# Patient Record
Sex: FEMALE | Race: WHITE | NOT HISPANIC OR LATINO | Employment: FULL TIME | URBAN - METROPOLITAN AREA
[De-identification: names, ages, dates, MRNs, and addresses within clinical notes are randomized per-mention and may not be internally consistent; named-entity substitution may affect disease eponyms.]

---

## 2023-03-07 DIAGNOSIS — Z00.00 ENCOUNTER FOR GENERAL ADULT MEDICAL EXAMINATION WITHOUT ABNORMAL FINDINGS: ICD-10-CM

## 2023-03-08 PROBLEM — I10 HYPERTENSION: Status: ACTIVE | Noted: 2023-03-08

## 2023-03-08 PROBLEM — E03.9 HYPOTHYROIDISM: Status: ACTIVE | Noted: 2023-03-08

## 2023-03-08 PROBLEM — G47.00 INSOMNIA: Status: ACTIVE | Noted: 2023-03-08

## 2023-03-08 PROBLEM — F32.A DEPRESSION: Status: ACTIVE | Noted: 2023-03-08

## 2023-03-08 RX ORDER — VALACYCLOVIR HYDROCHLORIDE 500 MG/1
1 TABLET, FILM COATED ORAL DAILY
COMMUNITY
Start: 2017-06-14 | End: 2023-03-09 | Stop reason: SDUPTHER

## 2023-03-08 RX ORDER — ROSUVASTATIN CALCIUM 10 MG/1
10 TABLET, COATED ORAL NIGHTLY
COMMUNITY
Start: 2022-03-27

## 2023-03-08 RX ORDER — HYDROCHLOROTHIAZIDE 25 MG/1
1 TABLET ORAL DAILY
COMMUNITY
Start: 2022-04-04 | End: 2023-04-24 | Stop reason: ALTCHOICE

## 2023-03-09 RX ORDER — VALACYCLOVIR HYDROCHLORIDE 500 MG/1
TABLET, FILM COATED ORAL
Qty: 90 TABLET | Refills: 1 | Status: SHIPPED | OUTPATIENT
Start: 2023-03-09 | End: 2024-04-24

## 2023-03-22 PROBLEM — S69.90XA HAND INJURY: Status: ACTIVE | Noted: 2023-03-22

## 2023-03-22 PROBLEM — M20.011 MALLET FINGER OF RIGHT HAND: Status: ACTIVE | Noted: 2023-03-22

## 2023-03-22 PROBLEM — N95.1 MENOPAUSE SYNDROME: Status: ACTIVE | Noted: 2023-03-22

## 2023-03-22 PROBLEM — Z86.010 PERSONAL HISTORY OF COLONIC POLYPS: Status: ACTIVE | Noted: 2023-03-22

## 2023-03-22 PROBLEM — S59.901A ELBOW INJURY, RIGHT, INITIAL ENCOUNTER: Status: ACTIVE | Noted: 2023-03-22

## 2023-03-22 PROBLEM — R92.8 ABNORMAL MAMMOGRAM: Status: ACTIVE | Noted: 2023-03-22

## 2023-03-22 PROBLEM — E66.3 OVERWEIGHT WITH BODY MASS INDEX (BMI) OF 25 TO 25.9 IN ADULT: Status: ACTIVE | Noted: 2023-03-22

## 2023-03-22 PROBLEM — R43.0 ANOSMIA: Status: ACTIVE | Noted: 2023-03-22

## 2023-03-22 PROBLEM — R58 ECCHYMOSIS: Status: ACTIVE | Noted: 2023-03-22

## 2023-03-22 PROBLEM — M79.641 PAIN IN BOTH HANDS: Status: ACTIVE | Noted: 2023-03-22

## 2023-03-22 PROBLEM — I77.4 CELIAC ARTERY STENOSIS: Status: ACTIVE | Noted: 2023-03-22

## 2023-03-22 PROBLEM — K63.5 COLON POLYP: Status: ACTIVE | Noted: 2023-03-22

## 2023-03-22 PROBLEM — M79.642 PAIN IN BOTH HANDS: Status: ACTIVE | Noted: 2023-03-22

## 2023-03-22 PROBLEM — I77.1 CELIAC ARTERY STENOSIS (CMS-HCC): Status: ACTIVE | Noted: 2023-03-22

## 2023-03-22 PROBLEM — R21 SKIN RASH: Status: ACTIVE | Noted: 2023-03-22

## 2023-03-22 PROBLEM — M79.631 PAIN IN RIGHT FOREARM: Status: ACTIVE | Noted: 2023-03-22

## 2023-03-22 PROBLEM — F07.81 POSTCONCUSSION SYNDROME: Status: ACTIVE | Noted: 2023-03-22

## 2023-03-22 PROBLEM — I73.00 RAYNAUD'S PHENOMENON: Status: ACTIVE | Noted: 2023-03-22

## 2023-03-22 PROBLEM — R43.9 UNSPECIFIED DISTURBANCES OF SMELL AND TASTE: Status: ACTIVE | Noted: 2023-03-22

## 2023-03-22 PROBLEM — Z86.0100 PERSONAL HISTORY OF COLONIC POLYPS: Status: ACTIVE | Noted: 2023-03-22

## 2023-03-22 PROBLEM — S50.00XA ELBOW CONTUSION: Status: ACTIVE | Noted: 2023-03-22

## 2023-03-22 PROBLEM — E55.9 VITAMIN D DEFICIENCY: Status: ACTIVE | Noted: 2023-03-22

## 2023-03-22 PROBLEM — S54.22XA: Status: ACTIVE | Noted: 2023-03-22

## 2023-03-22 PROBLEM — E04.2 NONTOXIC MULTINODULAR GOITER: Status: ACTIVE | Noted: 2023-03-22

## 2023-03-22 PROBLEM — R43.1 PAROSMIA: Status: ACTIVE | Noted: 2023-03-22

## 2023-03-22 PROBLEM — G44.84 EXERTIONAL HEADACHE: Status: ACTIVE | Noted: 2023-03-22

## 2023-03-22 RX ORDER — ERGOCALCIFEROL 1.25 MG/1
1.25 CAPSULE ORAL
COMMUNITY

## 2023-03-24 ENCOUNTER — OFFICE VISIT (OUTPATIENT)
Dept: PRIMARY CARE | Facility: CLINIC | Age: 53
End: 2023-03-24
Payer: COMMERCIAL

## 2023-03-24 VITALS
TEMPERATURE: 96.8 F | DIASTOLIC BLOOD PRESSURE: 76 MMHG | HEIGHT: 65 IN | HEART RATE: 98 BPM | SYSTOLIC BLOOD PRESSURE: 120 MMHG | OXYGEN SATURATION: 98 % | WEIGHT: 152 LBS | BODY MASS INDEX: 25.33 KG/M2

## 2023-03-24 DIAGNOSIS — Z12.31 BREAST CANCER SCREENING BY MAMMOGRAM: ICD-10-CM

## 2023-03-24 DIAGNOSIS — E66.3 OVERWEIGHT WITH BODY MASS INDEX (BMI) OF 25 TO 25.9 IN ADULT: ICD-10-CM

## 2023-03-24 DIAGNOSIS — E03.9 HYPOTHYROIDISM, UNSPECIFIED TYPE: ICD-10-CM

## 2023-03-24 DIAGNOSIS — Z11.4 ENCOUNTER FOR SCREENING FOR HIV: ICD-10-CM

## 2023-03-24 DIAGNOSIS — Z00.00 ENCOUNTER FOR ANNUAL PHYSICAL EXAM: Primary | ICD-10-CM

## 2023-03-24 DIAGNOSIS — Z11.59 NEED FOR HEPATITIS C SCREENING TEST: ICD-10-CM

## 2023-03-24 DIAGNOSIS — E78.2 MIXED HYPERLIPIDEMIA: ICD-10-CM

## 2023-03-24 DIAGNOSIS — E55.9 VITAMIN D DEFICIENCY: ICD-10-CM

## 2023-03-24 DIAGNOSIS — R73.9 HYPERGLYCEMIA: ICD-10-CM

## 2023-03-24 PROBLEM — D22.9 MULTIPLE BENIGN MELANOCYTIC NEVI: Status: ACTIVE | Noted: 2021-11-16

## 2023-03-24 PROCEDURE — 3008F BODY MASS INDEX DOCD: CPT | Performed by: INTERNAL MEDICINE

## 2023-03-24 PROCEDURE — 99396 PREV VISIT EST AGE 40-64: CPT | Performed by: INTERNAL MEDICINE

## 2023-03-24 PROCEDURE — 3074F SYST BP LT 130 MM HG: CPT | Performed by: INTERNAL MEDICINE

## 2023-03-24 PROCEDURE — 3078F DIAST BP <80 MM HG: CPT | Performed by: INTERNAL MEDICINE

## 2023-03-24 RX ORDER — ATORVASTATIN CALCIUM 10 MG/1
10 TABLET, FILM COATED ORAL DAILY
Qty: 30 TABLET | Refills: 5 | Status: SHIPPED | OUTPATIENT
Start: 2023-03-24 | End: 2023-04-18

## 2023-03-24 RX ORDER — FENOFIBRATE 48 MG/1
48 TABLET, FILM COATED ORAL DAILY
Qty: 30 TABLET | Refills: 5 | Status: SHIPPED | OUTPATIENT
Start: 2023-03-24 | End: 2023-04-18

## 2023-03-24 ASSESSMENT — ENCOUNTER SYMPTOMS
CONSTIPATION: 0
POLYDIPSIA: 0
MYALGIAS: 0
APPETITE CHANGE: 0
CONFUSION: 0
SINUS PAIN: 0
WOUND: 0
DIZZINESS: 0
STRIDOR: 0
SLEEP DISTURBANCE: 0
DYSURIA: 0
PHOTOPHOBIA: 0
NUMBNESS: 0
FLANK PAIN: 0
CHEST TIGHTNESS: 0
BACK PAIN: 0
FATIGUE: 0
NERVOUS/ANXIOUS: 0
PALPITATIONS: 0
SEIZURES: 0
FEVER: 0
SHORTNESS OF BREATH: 0
ACTIVITY CHANGE: 0
HALLUCINATIONS: 0
WHEEZING: 0
VOICE CHANGE: 0
VOMITING: 0
HEADACHES: 0
DIARRHEA: 0
SORE THROAT: 0
SPEECH DIFFICULTY: 0
FACIAL ASYMMETRY: 0
UNEXPECTED WEIGHT CHANGE: 0
NAUSEA: 0
TROUBLE SWALLOWING: 0
POLYPHAGIA: 0
NECK PAIN: 0
EYE PAIN: 0
ABDOMINAL PAIN: 0
ADENOPATHY: 0
BLOOD IN STOOL: 0
WEAKNESS: 0

## 2023-03-24 ASSESSMENT — PAIN SCALES - GENERAL: PAINLEVEL: 0-NO PAIN

## 2023-03-24 NOTE — PROGRESS NOTES
"Subjective   Patient ID: Mini Johnson is a 52 y.o. female who presents for Annual Exam (Review labs).    HPI   She denied for any symptoms and want refill on medications.    Review of Systems   Constitutional:  Negative for activity change, appetite change, fatigue, fever and unexpected weight change.   HENT:  Negative for dental problem, ear discharge, hearing loss, nosebleeds, postnasal drip, sinus pain, sore throat, trouble swallowing and voice change.    Eyes:  Negative for photophobia, pain and visual disturbance.   Respiratory:  Negative for chest tightness, shortness of breath, wheezing and stridor.    Cardiovascular:  Negative for chest pain, palpitations and leg swelling.   Gastrointestinal:  Negative for abdominal pain, blood in stool, constipation, diarrhea, nausea and vomiting.   Endocrine: Negative for polydipsia, polyphagia and polyuria.   Genitourinary:  Negative for decreased urine volume, dyspareunia, dysuria, flank pain and urgency.   Musculoskeletal:  Negative for back pain, gait problem, myalgias and neck pain.   Skin:  Negative for rash and wound.   Allergic/Immunologic: Negative for environmental allergies and food allergies.   Neurological:  Negative for dizziness, seizures, syncope, facial asymmetry, speech difficulty, weakness, numbness and headaches.   Hematological:  Negative for adenopathy.   Psychiatric/Behavioral:  Negative for behavioral problems, confusion, hallucinations, sleep disturbance and suicidal ideas. The patient is not nervous/anxious.        Objective   /76   Pulse 98   Temp 36 °C (96.8 °F)   Ht 1.651 m (5' 5\")   Wt 68.9 kg (152 lb)   SpO2 98%   BMI 25.29 kg/m²     Physical Exam  Constitutional:       General: She is not in acute distress.     Appearance: Normal appearance. She is not ill-appearing or toxic-appearing.   HENT:      Head: Normocephalic and atraumatic.      Nose: Nose normal.   Eyes:      Extraocular Movements: Extraocular movements intact.     "  Conjunctiva/sclera: Conjunctivae normal.      Pupils: Pupils are equal, round, and reactive to light.   Cardiovascular:      Rate and Rhythm: Normal rate and regular rhythm.      Pulses: Normal pulses.      Heart sounds: Normal heart sounds. No murmur heard.     No gallop.   Pulmonary:      Effort: No respiratory distress.      Breath sounds: No stridor. No wheezing or rales.   Abdominal:      General: There is no distension.      Tenderness: There is no abdominal tenderness. There is no right CVA tenderness, left CVA tenderness, guarding or rebound.   Musculoskeletal:         General: No swelling or deformity. Normal range of motion.      Cervical back: Normal range of motion and neck supple. No rigidity or tenderness.      Right lower leg: No edema.      Left lower leg: No edema.   Skin:     General: Skin is warm.      Coloration: Skin is not jaundiced.      Findings: No bruising, erythema or rash.   Neurological:      General: No focal deficit present.      Mental Status: She is alert and oriented to person, place, and time.      Cranial Nerves: No cranial nerve deficit.      Gait: Gait normal.   Psychiatric:         Mood and Affect: Mood normal.         Behavior: Behavior normal.         Thought Content: Thought content normal.         Judgment: Judgment normal.       Assessment/Plan   Problem List Items Addressed This Visit       Endocrine/Metabolic    Hypothyroidism    Relevant Orders    T4, free    T3, free    TSH    Vitamin D deficiency    Relevant Orders    Vitamin D, Total     Other Visit Diagnoses       Mixed hyperlipidemia    -      Relevant Medications    atorvastatin (Lipitor) 10 mg tablet    fenofibrate (Tricor) 48 mg tablet    Other Relevant Orders    Lipid Panel    Encounter for screening for HIV        Relevant Orders    HIV-1 and HIV-2 antibodies    Need for hepatitis C screening test        Relevant Orders    Hepatitis C Antibody    Encounter for Annual Physical exam- Primary    Relevant Orders     Hemoglobin A1C    Breast cancer screening by mammogram        Relevant Orders    BI mammo bilateral screening tomosynthesis     RTC at your next office visit.

## 2023-04-06 ENCOUNTER — APPOINTMENT (OUTPATIENT)
Dept: PRIMARY CARE | Facility: CLINIC | Age: 53
End: 2023-04-06

## 2023-04-15 DIAGNOSIS — E78.2 MIXED HYPERLIPIDEMIA: ICD-10-CM

## 2023-04-17 RX ORDER — HYDROCODONE BITARTRATE AND ACETAMINOPHEN 5; 325 MG/1; MG/1
TABLET ORAL
COMMUNITY
Start: 2015-04-06 | End: 2023-04-24 | Stop reason: ALTCHOICE

## 2023-04-17 RX ORDER — CYCLOBENZAPRINE HCL 10 MG
TABLET ORAL
COMMUNITY
Start: 2015-04-17 | End: 2023-04-24 | Stop reason: ALTCHOICE

## 2023-04-17 RX ORDER — VENLAFAXINE HYDROCHLORIDE 150 MG/1
CAPSULE, EXTENDED RELEASE ORAL
COMMUNITY
Start: 2015-05-02

## 2023-04-17 RX ORDER — ZOLPIDEM TARTRATE 5 MG/1
TABLET ORAL
COMMUNITY
Start: 2015-02-11

## 2023-04-17 RX ORDER — METAXALONE 800 MG/1
TABLET ORAL
COMMUNITY
Start: 2015-04-05

## 2023-04-18 RX ORDER — ATORVASTATIN CALCIUM 10 MG/1
TABLET, FILM COATED ORAL
Qty: 30 TABLET | Refills: 5 | Status: SHIPPED | OUTPATIENT
Start: 2023-04-18

## 2023-04-18 RX ORDER — FENOFIBRATE 48 MG/1
48 TABLET, FILM COATED ORAL DAILY
Qty: 30 TABLET | Refills: 5 | Status: SHIPPED | OUTPATIENT
Start: 2023-04-18 | End: 2023-10-15

## 2023-04-24 DIAGNOSIS — E03.9 HYPOTHYROIDISM, UNSPECIFIED TYPE: ICD-10-CM

## 2023-04-24 RX ORDER — THYROID 60 MG/1
60 TABLET ORAL
Qty: 270 TABLET | Refills: 3 | Status: SHIPPED | OUTPATIENT
Start: 2023-04-24 | End: 2024-04-25

## 2023-04-24 RX ORDER — THYROID 60 MG/1
60 TABLET ORAL
COMMUNITY
End: 2023-04-24 | Stop reason: SDUPTHER

## 2023-05-08 DIAGNOSIS — N64.89 BREAST ASYMMETRY IN FEMALE: ICD-10-CM

## 2023-07-24 ENCOUNTER — APPOINTMENT (OUTPATIENT)
Dept: PRIMARY CARE | Facility: CLINIC | Age: 53
End: 2023-07-24

## 2023-11-15 ENCOUNTER — OFFICE VISIT (OUTPATIENT)
Dept: PRIMARY CARE | Facility: CLINIC | Age: 53
End: 2023-11-15
Payer: COMMERCIAL

## 2023-11-15 VITALS
WEIGHT: 147 LBS | DIASTOLIC BLOOD PRESSURE: 86 MMHG | SYSTOLIC BLOOD PRESSURE: 128 MMHG | HEART RATE: 63 BPM | BODY MASS INDEX: 24.49 KG/M2 | HEIGHT: 65 IN | OXYGEN SATURATION: 98 % | TEMPERATURE: 96.6 F

## 2023-11-15 DIAGNOSIS — R82.998 URINARY CAST, HYALINE: ICD-10-CM

## 2023-11-15 DIAGNOSIS — I10 PRIMARY HYPERTENSION: Primary | ICD-10-CM

## 2023-11-15 PROCEDURE — 3079F DIAST BP 80-89 MM HG: CPT | Performed by: FAMILY MEDICINE

## 2023-11-15 PROCEDURE — 3008F BODY MASS INDEX DOCD: CPT | Performed by: FAMILY MEDICINE

## 2023-11-15 PROCEDURE — 3074F SYST BP LT 130 MM HG: CPT | Performed by: FAMILY MEDICINE

## 2023-11-15 PROCEDURE — 4004F PT TOBACCO SCREEN RCVD TLK: CPT | Performed by: FAMILY MEDICINE

## 2023-11-15 PROCEDURE — 99213 OFFICE O/P EST LOW 20 MIN: CPT | Performed by: FAMILY MEDICINE

## 2023-11-15 ASSESSMENT — ENCOUNTER SYMPTOMS
FEVER: 0
SORE THROAT: 0
PALPITATIONS: 0
VOMITING: 0
NUMBNESS: 0
DIZZINESS: 0
ABDOMINAL PAIN: 0
HEADACHES: 0
HEMATURIA: 0
DECREASED CONCENTRATION: 0
HALLUCINATIONS: 0
FATIGUE: 0
TROUBLE SWALLOWING: 0
DYSURIA: 0
JOINT SWELLING: 0
UNEXPECTED WEIGHT CHANGE: 0
BLOOD IN STOOL: 0
FREQUENCY: 0
CONFUSION: 0
EYE PAIN: 0
WEAKNESS: 0
NAUSEA: 0
SHORTNESS OF BREATH: 0
DIARRHEA: 0
COUGH: 0

## 2023-11-15 NOTE — PROGRESS NOTES
Subjective   Patient ID: Mini Johnson is a 53 y.o. female.    Patient is being followed by urology for some sort of mass on the kidney and was noted to have hyaline casts.  She was referred back to me for work-up.  She states she is typically very hydrated, she does not do strenuous exercise.  She has no blood in the urine, she has a history of hypertension that is under good control.  She has no medicines that should be contributing to the issue.  She is due for blood work and can get it today.         Review of Systems   Constitutional:  Negative for fatigue, fever and unexpected weight change.   HENT:  Negative for congestion, ear pain, hearing loss, sore throat and trouble swallowing.    Eyes:  Negative for pain and visual disturbance.   Respiratory:  Negative for cough and shortness of breath.    Cardiovascular:  Negative for chest pain, palpitations and leg swelling.   Gastrointestinal:  Negative for abdominal pain, blood in stool, diarrhea, nausea and vomiting.   Genitourinary:  Negative for dysuria, frequency, hematuria and urgency.   Musculoskeletal:  Negative for joint swelling.   Skin:  Negative for pallor and rash.   Neurological:  Negative for dizziness, syncope, weakness, numbness and headaches.   Psychiatric/Behavioral:  Negative for confusion, decreased concentration, hallucinations and suicidal ideas.      Vitals:    11/15/23 0857   BP: 128/86   Pulse: 63   Temp: 35.9 °C (96.6 °F)   SpO2: 98%      Objective   Physical Exam  Constitutional:       Appearance: Normal appearance.   Cardiovascular:      Rate and Rhythm: Normal rate and regular rhythm.   Pulmonary:      Effort: Pulmonary effort is normal. No respiratory distress.      Breath sounds: Normal breath sounds.   Abdominal:      General: Abdomen is flat. Bowel sounds are normal.      Palpations: Abdomen is soft.      Tenderness: There is no right CVA tenderness or left CVA tenderness.   Skin:     General: Skin is warm and dry.    Neurological:      General: No focal deficit present.      Mental Status: She is alert.         Assessment/Plan   Diagnoses and all orders for this visit:  Primary hypertension  -     Urinalysis with Reflex Microscopic; Future

## 2023-11-15 NOTE — PATIENT INSTRUCTIONS
I am not overly concerned about the hyaline casts.  We will check lab work, renal function and repeat the urinalysis.

## 2023-11-16 ENCOUNTER — LAB (OUTPATIENT)
Dept: LAB | Facility: LAB | Age: 53
End: 2023-11-16
Payer: COMMERCIAL

## 2023-11-16 DIAGNOSIS — Z11.59 NEED FOR HEPATITIS C SCREENING TEST: ICD-10-CM

## 2023-11-16 DIAGNOSIS — E03.9 HYPOTHYROIDISM, UNSPECIFIED TYPE: ICD-10-CM

## 2023-11-16 DIAGNOSIS — R73.9 HYPERGLYCEMIA: ICD-10-CM

## 2023-11-16 DIAGNOSIS — N64.89 BREAST ASYMMETRY IN FEMALE: ICD-10-CM

## 2023-11-16 DIAGNOSIS — E55.9 VITAMIN D DEFICIENCY: ICD-10-CM

## 2023-11-16 DIAGNOSIS — E78.2 MIXED HYPERLIPIDEMIA: ICD-10-CM

## 2023-11-16 DIAGNOSIS — I10 PRIMARY HYPERTENSION: ICD-10-CM

## 2023-11-16 LAB
25(OH)D3 SERPL-MCNC: 17 NG/ML (ref 30–100)
APPEARANCE UR: CLEAR
BILIRUB UR STRIP.AUTO-MCNC: NEGATIVE MG/DL
CHOLEST SERPL-MCNC: 285 MG/DL (ref 0–199)
CHOLESTEROL/HDL RATIO: 5.3
COLOR UR: YELLOW
CREAT SERPL-MCNC: 1.01 MG/DL (ref 0.5–1.05)
EST. AVERAGE GLUCOSE BLD GHB EST-MCNC: 88 MG/DL
GFR SERPL CREATININE-BSD FRML MDRD: 66 ML/MIN/1.73M*2
GLUCOSE UR STRIP.AUTO-MCNC: NEGATIVE MG/DL
HBA1C MFR BLD: 4.7 %
HCV AB SER QL: NONREACTIVE
HDLC SERPL-MCNC: 53.4 MG/DL
KETONES UR STRIP.AUTO-MCNC: NEGATIVE MG/DL
LDLC SERPL CALC-MCNC: 166 MG/DL
LEUKOCYTE ESTERASE UR QL STRIP.AUTO: NEGATIVE
NITRITE UR QL STRIP.AUTO: NEGATIVE
NON HDL CHOLESTEROL: 232 MG/DL (ref 0–149)
PH UR STRIP.AUTO: 6 [PH]
PROT UR STRIP.AUTO-MCNC: NEGATIVE MG/DL
RBC # UR STRIP.AUTO: NEGATIVE /UL
SP GR UR STRIP.AUTO: 1.01
T3FREE SERPL-MCNC: 3.8 PG/ML (ref 2.3–4.2)
T4 FREE SERPL-MCNC: 0.64 NG/DL (ref 0.61–1.12)
TRIGL SERPL-MCNC: 330 MG/DL (ref 0–149)
TSH SERPL-ACNC: 2.88 MIU/L (ref 0.44–3.98)
UROBILINOGEN UR STRIP.AUTO-MCNC: <2 MG/DL
VLDL: 66 MG/DL (ref 0–40)

## 2023-11-16 PROCEDURE — 82565 ASSAY OF CREATININE: CPT

## 2023-11-16 PROCEDURE — 81003 URINALYSIS AUTO W/O SCOPE: CPT

## 2023-11-16 PROCEDURE — 84443 ASSAY THYROID STIM HORMONE: CPT

## 2023-11-16 PROCEDURE — 85025 COMPLETE CBC W/AUTO DIFF WBC: CPT

## 2023-11-16 PROCEDURE — 36415 COLL VENOUS BLD VENIPUNCTURE: CPT

## 2023-11-16 PROCEDURE — 80061 LIPID PANEL: CPT

## 2023-11-16 PROCEDURE — 82306 VITAMIN D 25 HYDROXY: CPT

## 2023-11-16 PROCEDURE — 80053 COMPREHEN METABOLIC PANEL: CPT

## 2023-11-16 PROCEDURE — 84481 FREE ASSAY (FT-3): CPT

## 2023-11-16 PROCEDURE — 86803 HEPATITIS C AB TEST: CPT

## 2023-11-16 PROCEDURE — 84439 ASSAY OF FREE THYROXINE: CPT

## 2023-11-16 PROCEDURE — 83036 HEMOGLOBIN GLYCOSYLATED A1C: CPT

## 2023-11-17 DIAGNOSIS — I10 PRIMARY HYPERTENSION: Primary | ICD-10-CM

## 2023-11-17 LAB
ALBUMIN SERPL BCP-MCNC: 4.7 G/DL (ref 3.4–5)
ALP SERPL-CCNC: 106 U/L (ref 33–110)
ALT SERPL W P-5'-P-CCNC: 11 U/L (ref 7–45)
ANION GAP SERPL CALC-SCNC: 17 MMOL/L (ref 10–20)
AST SERPL W P-5'-P-CCNC: 13 U/L (ref 9–39)
BASOPHILS # BLD AUTO: 0.03 X10*3/UL (ref 0–0.1)
BASOPHILS NFR BLD AUTO: 0.5 %
BILIRUB SERPL-MCNC: 0.6 MG/DL (ref 0–1.2)
BUN SERPL-MCNC: 18 MG/DL (ref 6–23)
CALCIUM SERPL-MCNC: 9.4 MG/DL (ref 8.6–10.6)
CHLORIDE SERPL-SCNC: 102 MMOL/L (ref 98–107)
CO2 SERPL-SCNC: 26 MMOL/L (ref 21–32)
CREAT SERPL-MCNC: 1.01 MG/DL (ref 0.5–1.05)
EOSINOPHIL # BLD AUTO: 0.1 X10*3/UL (ref 0–0.7)
EOSINOPHIL NFR BLD AUTO: 1.5 %
ERYTHROCYTE [DISTWIDTH] IN BLOOD BY AUTOMATED COUNT: 13.2 % (ref 11.5–14.5)
GFR SERPL CREATININE-BSD FRML MDRD: 66 ML/MIN/1.73M*2
GLUCOSE SERPL-MCNC: 92 MG/DL (ref 74–99)
HCT VFR BLD AUTO: 40.8 % (ref 36–46)
HGB BLD-MCNC: 12.7 G/DL (ref 12–16)
IMM GRANULOCYTES # BLD AUTO: 0.02 X10*3/UL (ref 0–0.7)
IMM GRANULOCYTES NFR BLD AUTO: 0.3 % (ref 0–0.9)
LYMPHOCYTES # BLD AUTO: 2.13 X10*3/UL (ref 1.2–4.8)
LYMPHOCYTES NFR BLD AUTO: 32.2 %
MCH RBC QN AUTO: 33.4 PG (ref 26–34)
MCHC RBC AUTO-ENTMCNC: 31.1 G/DL (ref 32–36)
MCV RBC AUTO: 107 FL (ref 80–100)
MONOCYTES # BLD AUTO: 0.61 X10*3/UL (ref 0.1–1)
MONOCYTES NFR BLD AUTO: 9.2 %
NEUTROPHILS # BLD AUTO: 3.73 X10*3/UL (ref 1.2–7.7)
NEUTROPHILS NFR BLD AUTO: 56.3 %
NRBC BLD-RTO: 0 /100 WBCS (ref 0–0)
PLATELET # BLD AUTO: 325 X10*3/UL (ref 150–450)
POTASSIUM SERPL-SCNC: 4.9 MMOL/L (ref 3.5–5.3)
PROT SERPL-MCNC: 7 G/DL (ref 6.4–8.2)
RBC # BLD AUTO: 3.8 X10*6/UL (ref 4–5.2)
SODIUM SERPL-SCNC: 140 MMOL/L (ref 136–145)
WBC # BLD AUTO: 6.6 X10*3/UL (ref 4.4–11.3)

## 2024-04-12 ENCOUNTER — LAB (OUTPATIENT)
Dept: LAB | Facility: LAB | Age: 54
End: 2024-04-12
Payer: COMMERCIAL

## 2024-04-12 DIAGNOSIS — D17.71 BENIGN LIPOMATOUS NEOPLASM OF KIDNEY: Primary | ICD-10-CM

## 2024-04-12 LAB
ANION GAP SERPL CALC-SCNC: 15 MMOL/L (ref 10–20)
BUN SERPL-MCNC: 15 MG/DL (ref 6–23)
CALCIUM SERPL-MCNC: 9.2 MG/DL (ref 8.6–10.3)
CHLORIDE SERPL-SCNC: 101 MMOL/L (ref 98–107)
CO2 SERPL-SCNC: 28 MMOL/L (ref 21–32)
CREAT SERPL-MCNC: 0.99 MG/DL (ref 0.5–1.05)
EGFRCR SERPLBLD CKD-EPI 2021: 67 ML/MIN/1.73M*2
GLUCOSE SERPL-MCNC: 100 MG/DL (ref 74–99)
POTASSIUM SERPL-SCNC: 4.3 MMOL/L (ref 3.5–5.3)
SODIUM SERPL-SCNC: 140 MMOL/L (ref 136–145)

## 2024-04-12 PROCEDURE — 36415 COLL VENOUS BLD VENIPUNCTURE: CPT

## 2024-04-12 PROCEDURE — 80048 BASIC METABOLIC PNL TOTAL CA: CPT

## 2024-04-16 ENCOUNTER — HOSPITAL ENCOUNTER (OUTPATIENT)
Dept: RADIOLOGY | Facility: HOSPITAL | Age: 54
Discharge: HOME | End: 2024-04-16
Payer: COMMERCIAL

## 2024-04-16 DIAGNOSIS — N28.89 OTHER SPECIFIED DISORDERS OF KIDNEY AND URETER: ICD-10-CM

## 2024-04-16 PROCEDURE — 74170 CT ABD WO CNTRST FLWD CNTRST: CPT

## 2024-04-16 PROCEDURE — 74170 CT ABD WO CNTRST FLWD CNTRST: CPT | Performed by: RADIOLOGY

## 2024-04-16 PROCEDURE — 2550000001 HC RX 255 CONTRASTS

## 2024-04-16 RX ADMIN — IOHEXOL 75 ML: 350 INJECTION, SOLUTION INTRAVENOUS at 08:25

## 2024-04-24 DIAGNOSIS — Z00.00 ENCOUNTER FOR GENERAL ADULT MEDICAL EXAMINATION WITHOUT ABNORMAL FINDINGS: ICD-10-CM

## 2024-04-24 RX ORDER — TOPIRAMATE 25 MG/1
TABLET ORAL EVERY 12 HOURS
COMMUNITY

## 2024-04-24 RX ORDER — VALACYCLOVIR HYDROCHLORIDE 500 MG/1
TABLET, FILM COATED ORAL
Qty: 90 TABLET | Refills: 2 | Status: SHIPPED | OUTPATIENT
Start: 2024-04-24

## 2024-04-25 DIAGNOSIS — E03.9 HYPOTHYROIDISM, UNSPECIFIED TYPE: ICD-10-CM

## 2024-04-25 RX ORDER — SULFAMETHOXAZOLE AND TRIMETHOPRIM 800; 160 MG/1; MG/1
TABLET ORAL
Qty: 270 TABLET | Refills: 3 | Status: SHIPPED | OUTPATIENT
Start: 2024-04-25

## 2024-04-25 RX ORDER — SULFAMETHOXAZOLE AND TRIMETHOPRIM 800; 160 MG/1; MG/1
TABLET ORAL
Qty: 270 TABLET | Refills: 3 | Status: SHIPPED | OUTPATIENT
Start: 2024-04-25 | End: 2024-04-25

## 2024-04-27 ENCOUNTER — HOSPITAL ENCOUNTER (OUTPATIENT)
Dept: RADIOLOGY | Facility: EXTERNAL LOCATION | Age: 54
Discharge: HOME | End: 2024-04-27
Payer: COMMERCIAL

## 2024-07-24 ENCOUNTER — APPOINTMENT (OUTPATIENT)
Dept: PODIATRY | Facility: CLINIC | Age: 54
End: 2024-07-24
Payer: COMMERCIAL

## 2024-07-24 DIAGNOSIS — M79.674 GREAT TOE PAIN, RIGHT: Primary | ICD-10-CM

## 2024-07-24 PROCEDURE — 99202 OFFICE O/P NEW SF 15 MIN: CPT | Performed by: PODIATRIST

## 2024-07-24 NOTE — PROGRESS NOTES
This is a 54 y.o. 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, initial encounter (Multi) 07/11/2016    Contusion of brain    Headache, unspecified 08/09/2017    Acute intractable headache, unspecified headache type    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    Other specified soft tissue disorders     Limb swelling    Pain in unspecified limb 10/04/2016    Limb pain    Paresthesia of skin 10/06/2016    Tingling    Personal history of nicotine dependence 07/11/2016    Former smoker    Personal history of other (healed) physical injury and trauma     History of sprain of knee    Personal history of other diseases of the digestive system 01/31/2017    History of diverticulitis of colon    Personal history of other diseases of the musculoskeletal system and connective tissue 05/27/2014    History of muscle spasm    Personal history of other diseases of the musculoskeletal system and connective tissue 08/15/2016    History of neck pain    Personal history of other diseases of the musculoskeletal system and connective tissue 01/08/2017    History of low back pain    Personal history of other infectious and parasitic diseases 12/04/2017    History of herpes labialis    Personal history of other medical treatment 06/24/2016    History of screening mammography    Personal history of other medical treatment     History of screening mammography    Personal history of  other specified conditions 01/14/2017    History of diarrhea    Personal history of other specified conditions 10/14/2016    History of numbness    Personal history of other specified conditions 05/27/2014    History of dysuria    Personal history of other specified conditions 06/25/2018    History of bruising easily    Personal history of other specified conditions 06/25/2018    History of wheezing    Personal history of other specified conditions 01/24/2015    History of fatigue    Personal history of other specified conditions 08/08/2018    History of headache    Personal history of other specified conditions     History of fatigue    Personal history of pneumonia (recurrent) 08/08/2018    History of pneumonia    Personal history of traumatic brain injury 10/01/2017    History of concussion    Personal history of traumatic brain injury 12/18/2018    History of closed head injury    Post-surgical hypothyroidism 10/30/2013    S/P hysterectomy 10/02/2014    Vaginal lesion 11/28/2011       Medications and Allergies have been reviewed.    Review Of Systems:  GENERAL: No weight loss, malaise or fevers.  HEENT: Negative for frequent or significant headaches,   RESPIRATORY: Negative for cough, wheezing or shortness of breath.  CARDIOVASCULAR: Negative for chest pain, leg swelling or palpitations.    Physical Exam:  Patient is a pleasant, cooperative, well developed 54 y.o.  adult female. The patient is alert and oriented to time, place and person.   Patient has normal affect and mood.    Examination of Both Lower Extremities:   Objective:   Vasc: DP and PT pulses are palpable bilateral.  CFT is less than 3 seconds bilateral.  Skin temperature is warm to cool proximal to distal bilateral.      Neuro: Vibratory, light touch and proprioception are intact bilateral.  Protective sensation is intact to the foot when tested with the 5.07 SWM bilateral.  Achilles reflex is 2/4 bilateral.  There is no clonus noted.  The hallux is  downgoing bilateral.      Derm: Nails 1-5 bilateral are intact.  Right hallux notes mild thickness and discolor.     Ortho: Muscle strength is 5/5 for all pedal groups tested.       1. Great toe pain, right          Patient exam and eval  Discussed thick nails  No SOI noted  Recommend keeping filed down  Ok to paint if needed  No otherp edal complaints  Fu prn    Dolly Rosenthal DPM  [phone]  [facility]  Fax: [phone]

## 2024-07-31 ENCOUNTER — TELEPHONE (OUTPATIENT)
Dept: PRIMARY CARE | Facility: CLINIC | Age: 54
End: 2024-07-31
Payer: COMMERCIAL

## 2024-07-31 NOTE — TELEPHONE ENCOUNTER
Left leg numbness since yesterday. No pain, swelling or redness. Wanting to be seen. Would you like to squeeze her in or is she okay to wait until Monday? Offered Emmanuel but denied seeing him.

## 2024-08-05 ENCOUNTER — APPOINTMENT (OUTPATIENT)
Dept: PRIMARY CARE | Facility: CLINIC | Age: 54
End: 2024-08-05
Payer: COMMERCIAL

## 2024-08-16 ENCOUNTER — APPOINTMENT (OUTPATIENT)
Dept: PRIMARY CARE | Facility: CLINIC | Age: 54
End: 2024-08-16
Payer: COMMERCIAL

## 2024-08-16 VITALS
TEMPERATURE: 98.2 F | BODY MASS INDEX: 22.96 KG/M2 | OXYGEN SATURATION: 98 % | WEIGHT: 137.8 LBS | SYSTOLIC BLOOD PRESSURE: 138 MMHG | HEART RATE: 86 BPM | HEIGHT: 65 IN | DIASTOLIC BLOOD PRESSURE: 86 MMHG

## 2024-08-16 DIAGNOSIS — M79.2 LEG NEURALGIA, LEFT: ICD-10-CM

## 2024-08-16 DIAGNOSIS — D17.71 RENAL ANGIOLIPOMA: Primary | ICD-10-CM

## 2024-08-16 DIAGNOSIS — E03.9 ACQUIRED HYPOTHYROIDISM: ICD-10-CM

## 2024-08-16 PROBLEM — F32.A DEPRESSIVE DISORDER: Status: ACTIVE | Noted: 2023-03-08

## 2024-08-16 PROBLEM — L82.1 OTHER SEBORRHEIC KERATOSIS: Status: ACTIVE | Noted: 2017-10-25

## 2024-08-16 PROBLEM — N95.1 MENOPAUSAL SYNDROME: Status: ACTIVE | Noted: 2023-03-22

## 2024-08-16 PROBLEM — S50.00XA CONTUSION OF ELBOW: Status: ACTIVE | Noted: 2024-08-16

## 2024-08-16 PROBLEM — E66.3 OVERWEIGHT WITH BODY MASS INDEX (BMI) 25.0-29.9: Status: ACTIVE | Noted: 2024-08-16

## 2024-08-16 PROBLEM — D22.39 MELANOCYTIC NEVI OF OTHER PARTS OF FACE: Status: ACTIVE | Noted: 2021-11-16

## 2024-08-16 PROBLEM — D18.01 HEMANGIOMA OF SKIN AND SUBCUTANEOUS TISSUE: Status: ACTIVE | Noted: 2021-11-16

## 2024-08-16 PROBLEM — S54.20XA: Status: ACTIVE | Noted: 2023-03-22

## 2024-08-16 PROBLEM — L90.5 SCAR CONDITIONS AND FIBROSIS OF SKIN: Status: ACTIVE | Noted: 2017-10-25

## 2024-08-16 PROBLEM — L90.5 SCAR CONDITION AND FIBROSIS OF SKIN: Status: ACTIVE | Noted: 2021-11-16

## 2024-08-16 PROBLEM — M79.643 PAIN OF HAND: Status: ACTIVE | Noted: 2024-08-16

## 2024-08-16 PROBLEM — S59.909A ELBOW INJURY: Status: ACTIVE | Noted: 2023-03-22

## 2024-08-16 PROBLEM — M79.89 SWELLING OF EXTREMITY: Status: ACTIVE | Noted: 2024-08-16

## 2024-08-16 PROBLEM — D22.70 MELANOCYTIC NEVI OF UNSPECIFIED LOWER LIMB, INCLUDING HIP: Status: ACTIVE | Noted: 2021-11-16

## 2024-08-16 PROBLEM — M20.009 ACQUIRED DEFORMITY OF DISTAL INTERPHALANGEAL (DIP) JOINT OF FINGER DUE TO TRAUMA: Status: ACTIVE | Noted: 2024-08-16

## 2024-08-16 PROBLEM — D22.4 MELANOCYTIC NEVI OF SCALP AND NECK: Status: ACTIVE | Noted: 2017-10-25

## 2024-08-16 PROBLEM — R03.0 ELEVATED BLOOD PRESSURE READING WITHOUT DIAGNOSIS OF HYPERTENSION: Status: ACTIVE | Noted: 2024-08-16

## 2024-08-16 PROBLEM — R21 RASH: Status: ACTIVE | Noted: 2023-03-22

## 2024-08-16 PROBLEM — L91.8 OTHER HYPERTROPHIC DISORDERS OF THE SKIN: Status: ACTIVE | Noted: 2021-11-16

## 2024-08-16 PROBLEM — L81.4 OTHER MELANIN HYPERPIGMENTATION: Status: ACTIVE | Noted: 2021-11-16

## 2024-08-16 PROBLEM — D22.5 MELANOCYTIC NEVI OF TRUNK: Status: ACTIVE | Noted: 2021-11-16

## 2024-08-16 PROBLEM — Z86.010 HISTORY OF COLONIC POLYPS: Status: ACTIVE | Noted: 2023-03-22

## 2024-08-16 PROBLEM — D49.2 NEOPLASM OF UNSPECIFIED BEHAVIOR OF BONE, SOFT TISSUE, AND SKIN: Status: ACTIVE | Noted: 2021-11-16

## 2024-08-16 PROBLEM — D22.62 MELANOCYTIC NEVI OF LEFT UPPER LIMB, INCLUDING SHOULDER: Status: ACTIVE | Noted: 2021-11-16

## 2024-08-16 PROBLEM — D44.0 NEOPLASM OF UNCERTAIN BEHAVIOR OF THYROID GLAND: Status: ACTIVE | Noted: 2017-10-25

## 2024-08-16 PROBLEM — R43.0 LOSS OF SENSE OF SMELL: Status: ACTIVE | Noted: 2023-03-22

## 2024-08-16 PROBLEM — Z86.0100 HISTORY OF COLONIC POLYPS: Status: ACTIVE | Noted: 2023-03-22

## 2024-08-16 PROBLEM — L81.9 DISORDER OF PIGMENTATION, UNSPECIFIED: Status: ACTIVE | Noted: 2017-10-25

## 2024-08-16 PROCEDURE — 99214 OFFICE O/P EST MOD 30 MIN: CPT | Performed by: FAMILY MEDICINE

## 2024-08-16 PROCEDURE — 3008F BODY MASS INDEX DOCD: CPT | Performed by: FAMILY MEDICINE

## 2024-08-16 RX ORDER — METHYLPREDNISOLONE 4 MG/1
TABLET ORAL
Qty: 21 TABLET | Refills: 0 | Status: SHIPPED | OUTPATIENT
Start: 2024-08-16

## 2024-08-16 ASSESSMENT — PATIENT HEALTH QUESTIONNAIRE - PHQ9
1. LITTLE INTEREST OR PLEASURE IN DOING THINGS: NOT AT ALL
2. FEELING DOWN, DEPRESSED OR HOPELESS: NOT AT ALL
SUM OF ALL RESPONSES TO PHQ9 QUESTIONS 1 AND 2: 0

## 2024-08-16 ASSESSMENT — PAIN SCALES - GENERAL: PAINLEVEL: 0-NO PAIN

## 2024-08-17 ENCOUNTER — LAB (OUTPATIENT)
Dept: LAB | Facility: LAB | Age: 54
End: 2024-08-17
Payer: COMMERCIAL

## 2024-08-17 DIAGNOSIS — M79.2 LEG NEURALGIA, LEFT: ICD-10-CM

## 2024-08-17 DIAGNOSIS — D17.71 RENAL ANGIOLIPOMA: ICD-10-CM

## 2024-08-17 DIAGNOSIS — E03.9 ACQUIRED HYPOTHYROIDISM: ICD-10-CM

## 2024-08-17 LAB
25(OH)D3 SERPL-MCNC: 38 NG/ML (ref 30–100)
ALBUMIN SERPL BCP-MCNC: 4.8 G/DL (ref 3.4–5)
ALP SERPL-CCNC: 121 U/L (ref 33–110)
ALT SERPL W P-5'-P-CCNC: 12 U/L (ref 7–45)
ANION GAP SERPL CALC-SCNC: 14 MMOL/L (ref 10–20)
APPEARANCE UR: CLEAR
AST SERPL W P-5'-P-CCNC: 13 U/L (ref 9–39)
BASOPHILS # BLD AUTO: 0.03 X10*3/UL (ref 0–0.1)
BASOPHILS NFR BLD AUTO: 0.4 %
BILIRUB SERPL-MCNC: 0.8 MG/DL (ref 0–1.2)
BILIRUB UR STRIP.AUTO-MCNC: NEGATIVE MG/DL
BUN SERPL-MCNC: 18 MG/DL (ref 6–23)
CALCIUM SERPL-MCNC: 9.6 MG/DL (ref 8.6–10.3)
CHLORIDE SERPL-SCNC: 103 MMOL/L (ref 98–107)
CHOLEST SERPL-MCNC: 212 MG/DL (ref 0–199)
CHOLESTEROL/HDL RATIO: 3.8
CO2 SERPL-SCNC: 26 MMOL/L (ref 21–32)
COLOR UR: NORMAL
CREAT SERPL-MCNC: 0.98 MG/DL (ref 0.5–1.05)
EGFRCR SERPLBLD CKD-EPI 2021: 68 ML/MIN/1.73M*2
EOSINOPHIL # BLD AUTO: 0.09 X10*3/UL (ref 0–0.7)
EOSINOPHIL NFR BLD AUTO: 1.1 %
ERYTHROCYTE [DISTWIDTH] IN BLOOD BY AUTOMATED COUNT: 12.1 % (ref 11.5–14.5)
EST. AVERAGE GLUCOSE BLD GHB EST-MCNC: 100 MG/DL
GLUCOSE SERPL-MCNC: 101 MG/DL (ref 74–99)
GLUCOSE UR STRIP.AUTO-MCNC: NORMAL MG/DL
HBA1C MFR BLD: 5.1 %
HCT VFR BLD AUTO: 42.2 % (ref 36–46)
HDLC SERPL-MCNC: 56.5 MG/DL
HGB BLD-MCNC: 13.9 G/DL (ref 12–16)
IMM GRANULOCYTES # BLD AUTO: 0.02 X10*3/UL (ref 0–0.7)
IMM GRANULOCYTES NFR BLD AUTO: 0.2 % (ref 0–0.9)
KETONES UR STRIP.AUTO-MCNC: NEGATIVE MG/DL
LDLC SERPL CALC-MCNC: 119 MG/DL
LEUKOCYTE ESTERASE UR QL STRIP.AUTO: NEGATIVE
LYMPHOCYTES # BLD AUTO: 1.81 X10*3/UL (ref 1.2–4.8)
LYMPHOCYTES NFR BLD AUTO: 22.2 %
MCH RBC QN AUTO: 32.3 PG (ref 26–34)
MCHC RBC AUTO-ENTMCNC: 32.9 G/DL (ref 32–36)
MCV RBC AUTO: 98 FL (ref 80–100)
MONOCYTES # BLD AUTO: 0.74 X10*3/UL (ref 0.1–1)
MONOCYTES NFR BLD AUTO: 9.1 %
NEUTROPHILS # BLD AUTO: 5.46 X10*3/UL (ref 1.2–7.7)
NEUTROPHILS NFR BLD AUTO: 67 %
NITRITE UR QL STRIP.AUTO: NEGATIVE
NON HDL CHOLESTEROL: 156 MG/DL (ref 0–149)
NRBC BLD-RTO: 0 /100 WBCS (ref 0–0)
PH UR STRIP.AUTO: 6 [PH]
PLATELET # BLD AUTO: 317 X10*3/UL (ref 150–450)
POTASSIUM SERPL-SCNC: 4.6 MMOL/L (ref 3.5–5.3)
PROT SERPL-MCNC: 7.1 G/DL (ref 6.4–8.2)
PROT UR STRIP.AUTO-MCNC: NEGATIVE MG/DL
RBC # BLD AUTO: 4.31 X10*6/UL (ref 4–5.2)
RBC # UR STRIP.AUTO: NEGATIVE /UL
SODIUM SERPL-SCNC: 138 MMOL/L (ref 136–145)
SP GR UR STRIP.AUTO: 1.01
TRIGL SERPL-MCNC: 183 MG/DL (ref 0–149)
TSH SERPL-ACNC: 0.06 MIU/L (ref 0.44–3.98)
UROBILINOGEN UR STRIP.AUTO-MCNC: NORMAL MG/DL
VLDL: 37 MG/DL (ref 0–40)
WBC # BLD AUTO: 8.2 X10*3/UL (ref 4.4–11.3)

## 2024-08-17 PROCEDURE — 84443 ASSAY THYROID STIM HORMONE: CPT

## 2024-08-17 PROCEDURE — 82306 VITAMIN D 25 HYDROXY: CPT

## 2024-08-17 PROCEDURE — 83036 HEMOGLOBIN GLYCOSYLATED A1C: CPT

## 2024-08-17 PROCEDURE — 81003 URINALYSIS AUTO W/O SCOPE: CPT

## 2024-08-17 PROCEDURE — 36415 COLL VENOUS BLD VENIPUNCTURE: CPT

## 2024-08-17 PROCEDURE — 85025 COMPLETE CBC W/AUTO DIFF WBC: CPT

## 2024-08-17 PROCEDURE — 80053 COMPREHEN METABOLIC PANEL: CPT

## 2024-08-17 PROCEDURE — 86038 ANTINUCLEAR ANTIBODIES: CPT

## 2024-08-17 PROCEDURE — 80061 LIPID PANEL: CPT

## 2024-08-20 ENCOUNTER — HOSPITAL ENCOUNTER (OUTPATIENT)
Dept: NEUROLOGY | Facility: CLINIC | Age: 54
Discharge: HOME | End: 2024-08-20
Payer: COMMERCIAL

## 2024-08-20 DIAGNOSIS — M79.2 LEG NEURALGIA, LEFT: ICD-10-CM

## 2024-08-20 PROCEDURE — 95886 MUSC TEST DONE W/N TEST COMP: CPT | Performed by: PSYCHIATRY & NEUROLOGY

## 2024-08-20 PROCEDURE — 95910 NRV CNDJ TEST 7-8 STUDIES: CPT | Performed by: PSYCHIATRY & NEUROLOGY

## 2024-08-21 LAB — ANA SER QL HEP2 SUBST: NEGATIVE

## 2024-08-22 PROBLEM — S59.901A ELBOW INJURY, RIGHT, INITIAL ENCOUNTER: Status: RESOLVED | Noted: 2023-03-22 | Resolved: 2024-08-22

## 2024-08-22 PROBLEM — R43.0 LOSS OF SENSE OF SMELL: Status: RESOLVED | Noted: 2023-03-22 | Resolved: 2024-08-22

## 2024-08-22 PROBLEM — M79.2: Status: ACTIVE | Noted: 2024-08-22

## 2024-08-22 PROBLEM — M79.643 PAIN OF HAND: Status: RESOLVED | Noted: 2024-08-16 | Resolved: 2024-08-22

## 2024-08-22 PROBLEM — R21 RASH: Status: RESOLVED | Noted: 2023-03-22 | Resolved: 2024-08-22

## 2024-08-22 PROBLEM — E66.3 OVERWEIGHT WITH BODY MASS INDEX (BMI) OF 25 TO 25.9 IN ADULT: Status: RESOLVED | Noted: 2023-03-22 | Resolved: 2024-08-22

## 2024-08-22 PROBLEM — Z86.010 HISTORY OF COLONIC POLYPS: Status: RESOLVED | Noted: 2023-03-22 | Resolved: 2024-08-22

## 2024-08-22 PROBLEM — M79.89 SWELLING OF EXTREMITY: Status: RESOLVED | Noted: 2024-08-16 | Resolved: 2024-08-22

## 2024-08-22 PROBLEM — E66.3 OVERWEIGHT WITH BODY MASS INDEX (BMI) 25.0-29.9: Status: RESOLVED | Noted: 2024-08-16 | Resolved: 2024-08-22

## 2024-08-22 PROBLEM — R21 SKIN RASH: Status: RESOLVED | Noted: 2023-03-22 | Resolved: 2024-08-22

## 2024-08-22 PROBLEM — R43.1 PAROSMIA: Status: RESOLVED | Noted: 2023-03-22 | Resolved: 2024-08-22

## 2024-08-22 PROBLEM — F32.A DEPRESSIVE DISORDER: Status: RESOLVED | Noted: 2023-03-08 | Resolved: 2024-08-22

## 2024-08-22 PROBLEM — M79.641 PAIN IN BOTH HANDS: Status: RESOLVED | Noted: 2023-03-22 | Resolved: 2024-08-22

## 2024-08-22 PROBLEM — E03.9 HYPOTHYROID: Status: RESOLVED | Noted: 2024-08-16 | Resolved: 2024-08-22

## 2024-08-22 PROBLEM — Z86.010 PERSONAL HISTORY OF COLONIC POLYPS: Status: RESOLVED | Noted: 2023-03-22 | Resolved: 2024-08-22

## 2024-08-22 PROBLEM — Z86.0100 PERSONAL HISTORY OF COLONIC POLYPS: Status: RESOLVED | Noted: 2023-03-22 | Resolved: 2024-08-22

## 2024-08-22 PROBLEM — N95.1 MENOPAUSAL SYNDROME: Status: RESOLVED | Noted: 2023-03-22 | Resolved: 2024-08-22

## 2024-08-22 PROBLEM — R58 ECCHYMOSIS: Status: RESOLVED | Noted: 2023-03-22 | Resolved: 2024-08-22

## 2024-08-22 PROBLEM — R03.0 ELEVATED BLOOD PRESSURE READING WITHOUT DIAGNOSIS OF HYPERTENSION: Status: RESOLVED | Noted: 2024-08-16 | Resolved: 2024-08-22

## 2024-08-22 PROBLEM — R43.9 UNSPECIFIED DISTURBANCES OF SMELL AND TASTE: Status: RESOLVED | Noted: 2023-03-22 | Resolved: 2024-08-22

## 2024-08-22 PROBLEM — M79.631 PAIN IN RIGHT FOREARM: Status: RESOLVED | Noted: 2023-03-22 | Resolved: 2024-08-22

## 2024-08-22 PROBLEM — M20.009 ACQUIRED DEFORMITY OF DISTAL INTERPHALANGEAL (DIP) JOINT OF FINGER DUE TO TRAUMA: Status: RESOLVED | Noted: 2024-08-16 | Resolved: 2024-08-22

## 2024-08-22 PROBLEM — Z86.0100 HISTORY OF COLONIC POLYPS: Status: RESOLVED | Noted: 2023-03-22 | Resolved: 2024-08-22

## 2024-08-22 PROBLEM — S54.22XA: Status: RESOLVED | Noted: 2023-03-22 | Resolved: 2024-08-22

## 2024-08-22 PROBLEM — D17.71 RENAL ANGIOLIPOMA: Status: ACTIVE | Noted: 2024-08-22

## 2024-08-22 PROBLEM — M79.642 PAIN IN BOTH HANDS: Status: RESOLVED | Noted: 2023-03-22 | Resolved: 2024-08-22

## 2024-08-22 PROBLEM — I10 HYPERTENSION: Status: RESOLVED | Noted: 2023-03-08 | Resolved: 2024-08-22

## 2024-08-22 PROBLEM — S50.00XA CONTUSION OF ELBOW: Status: RESOLVED | Noted: 2024-08-16 | Resolved: 2024-08-22

## 2024-08-22 PROBLEM — L90.5 SCAR CONDITION AND FIBROSIS OF SKIN: Status: RESOLVED | Noted: 2021-11-16 | Resolved: 2024-08-22

## 2024-08-22 PROBLEM — R82.998 URINARY CAST, HYALINE: Status: RESOLVED | Noted: 2023-11-15 | Resolved: 2024-08-22

## 2024-08-22 ASSESSMENT — ENCOUNTER SYMPTOMS
UNEXPECTED WEIGHT CHANGE: 0
VOMITING: 0
PALPITATIONS: 0
FEVER: 0
FREQUENCY: 0
HALLUCINATIONS: 0
HEMATURIA: 0
HEADACHES: 0
DECREASED CONCENTRATION: 0
DYSURIA: 0
NUMBNESS: 1
COUGH: 0
TROUBLE SWALLOWING: 0
DIARRHEA: 0
ABDOMINAL PAIN: 0
NAUSEA: 0
BLOOD IN STOOL: 0
JOINT SWELLING: 0
CONFUSION: 0
SORE THROAT: 0
WEAKNESS: 0
EYE PAIN: 0
SHORTNESS OF BREATH: 0
FATIGUE: 0
DIZZINESS: 0

## 2024-08-22 NOTE — PROGRESS NOTES
Subjective   Patient ID: Mini Johnson is a 54 y.o. female.    Patient is here for few issues.  She has been having numbness and tingling below the left knee.  There is been no trauma.  She was not leaning her leg up against an object or crossing her legs for an inordinate amount of time.  It has been going on for weeks to months.  There is no weakness.  She has hypothyroidism and is due for labs.  She has a history of renal angiolipoma that is getting larger.  She will need a urologist.  She has no increased frequency or hematuria.  History of Raynaud's but no known autoimmune disease.  There is no back pain.  She has no swelling of the calf but may have been at the onset.  No redness.  No history of clot.        Review of Systems   Constitutional:  Negative for fatigue, fever and unexpected weight change.   HENT:  Negative for congestion, ear pain, hearing loss, sore throat and trouble swallowing.    Eyes:  Negative for pain and visual disturbance.   Respiratory:  Negative for cough and shortness of breath.    Cardiovascular:  Negative for chest pain, palpitations and leg swelling.   Gastrointestinal:  Negative for abdominal pain, blood in stool, diarrhea, nausea and vomiting.   Genitourinary:  Negative for dysuria, frequency, hematuria and urgency.   Musculoskeletal:  Negative for joint swelling.   Skin:  Negative for pallor and rash.   Neurological:  Positive for numbness. Negative for dizziness, syncope, weakness and headaches.   Psychiatric/Behavioral:  Negative for confusion, decreased concentration, hallucinations and suicidal ideas.      Vitals:    08/16/24 1459   BP: 138/86   Pulse: 86   Temp: 36.8 °C (98.2 °F)   SpO2: 98%      Objective   Physical Exam  Constitutional:       Appearance: Normal appearance.   Musculoskeletal:      Comments: There is no deformity to observation or palpation.  It actually measures a few millimeters less then the right calf.  Negative Homans' sign.  There is no weakness.   There is decreased sensation.  Below the knee.   Neurological:      Mental Status: She is alert.         Assessment/Plan   Diagnoses and all orders for this visit:  Renal angiolipoma  -     Referral to Urology; Future  -     Comprehensive Metabolic Panel; Future  -     Urinalysis with Reflex Microscopic; Future  Leg neuralgia, left  -     EMG & nerve conduction; Future  -     methylPREDNISolone (Medrol, Mehul,) 4 mg tablets; Follow schedule on package instructions  -     NANCY with Reflex to YOU; Future  Acquired hypothyroidism  -     CBC and Auto Differential; Future  -     Comprehensive Metabolic Panel; Future  -     Hemoglobin A1C; Future  -     Lipid Panel; Future  -     Thyroid Stimulating Hormone; Future  -     Vitamin D 25-Hydroxy,Total (for eval of Vitamin D levels); Future

## 2024-08-22 NOTE — PATIENT INSTRUCTIONS
It was nice to see you today!  Discussed current concerns and addressed   Reviewed recent labs and diagnostics  Reviewed medications list  Continue to eat a healthy diet, exercise at least 3 times a week or more  Plan and follow up discussed  For any further information related to your condition, copy and paste or go to [email or web address]  Very curious diminished sensation in the left lower extremity I am not clear what.  Perhaps a peripheral nerve inflammation.  We discussed watching and waiting but due to the length of symptoms we will pursue with an EMG.  We will also try a Medrol Dosepak and avoid crossing legs and applying pressure on the lower extremity.  We will recheck all of her labs including TSH.  I will get her into urology for further evaluation and treatment of the exophytic renal angiolipoma that is growing.

## 2024-08-27 ENCOUNTER — TELEPHONE (OUTPATIENT)
Dept: PRIMARY CARE | Facility: CLINIC | Age: 54
End: 2024-08-27
Payer: COMMERCIAL

## 2024-08-27 DIAGNOSIS — Z12.39 BREAST SCREENING: ICD-10-CM

## 2024-08-29 ENCOUNTER — HOSPITAL ENCOUNTER (OUTPATIENT)
Dept: RADIOLOGY | Facility: HOSPITAL | Age: 54
Discharge: HOME | End: 2024-08-29
Payer: COMMERCIAL

## 2024-08-29 VITALS — WEIGHT: 133 LBS | BODY MASS INDEX: 22.16 KG/M2 | HEIGHT: 65 IN

## 2024-08-29 DIAGNOSIS — Z12.39 BREAST SCREENING: ICD-10-CM

## 2024-08-29 PROCEDURE — 77067 SCR MAMMO BI INCL CAD: CPT

## 2024-09-03 ENCOUNTER — TELEPHONE (OUTPATIENT)
Dept: PRIMARY CARE | Facility: CLINIC | Age: 54
End: 2024-09-03
Payer: COMMERCIAL

## 2024-09-03 NOTE — TELEPHONE ENCOUNTER
Pt called stating she received abnormal mammogram results and was told to call to get order for diagnostics

## 2024-09-05 ENCOUNTER — APPOINTMENT (OUTPATIENT)
Dept: NEUROLOGY | Facility: CLINIC | Age: 54
End: 2024-09-05
Payer: COMMERCIAL

## 2024-09-10 ENCOUNTER — HOSPITAL ENCOUNTER (OUTPATIENT)
Dept: RADIOLOGY | Facility: CLINIC | Age: 54
Discharge: HOME | End: 2024-09-10
Payer: COMMERCIAL

## 2024-09-10 DIAGNOSIS — Z12.39 BREAST SCREENING: ICD-10-CM

## 2024-09-10 PROCEDURE — 77061 BREAST TOMOSYNTHESIS UNI: CPT | Mod: RIGHT SIDE | Performed by: STUDENT IN AN ORGANIZED HEALTH CARE EDUCATION/TRAINING PROGRAM

## 2024-09-10 PROCEDURE — 77065 DX MAMMO INCL CAD UNI: CPT | Mod: RIGHT SIDE | Performed by: STUDENT IN AN ORGANIZED HEALTH CARE EDUCATION/TRAINING PROGRAM

## 2024-09-10 PROCEDURE — 77061 BREAST TOMOSYNTHESIS UNI: CPT | Mod: RT

## 2024-09-12 ENCOUNTER — APPOINTMENT (OUTPATIENT)
Dept: RADIOLOGY | Facility: HOSPITAL | Age: 54
End: 2024-09-12
Payer: COMMERCIAL

## 2024-09-17 DIAGNOSIS — R92.8 ABNORMAL SCREENING MAMMOGRAM: Primary | ICD-10-CM

## 2024-09-30 ENCOUNTER — APPOINTMENT (OUTPATIENT)
Dept: UROLOGY | Facility: CLINIC | Age: 54
End: 2024-09-30
Payer: COMMERCIAL

## 2024-09-30 DIAGNOSIS — D17.71 RENAL ANGIOLIPOMA: ICD-10-CM

## 2024-09-30 PROCEDURE — 99204 OFFICE O/P NEW MOD 45 MIN: CPT | Performed by: STUDENT IN AN ORGANIZED HEALTH CARE EDUCATION/TRAINING PROGRAM

## 2024-09-30 NOTE — PROGRESS NOTES
Subjective   Patient ID: Mini Johnson is a 54 y.o. female.    HPI  Mini Johnson is a 54 y.o. female with a diagnosis of renal angiolipoma who was referred to us today by Dr. Jacome.     Patient had an increase in her left renal angiolipoma size from 2.2cm in 2022 to 3.1cm in 2023 recorded on ultrasound. We do not have an MRI. CT showed 1.65cm in 2020. CT in the past showed 1.7cm. Last CT shows a 2.3cm size.    CT of kidney 04/16/2024  IMPRESSION:  Left renal upper pole partially exophytic 2.3 cm macroscopic fat containing mass compatible with an angiomyolipoma, slightly larger compared to prior CT of 2017.    Review of Systems  A complete review of systems was performed. All systems are noted to be negative unless indicated in the history of present illness, impression, active problem list, or past histories.    Objective   Physical Exam  NAD, alert  No abdominal scars  No CVA tenderness    Assessment/Plan   Diagnoses and all orders for this visit:  Renal angiolipoma  -     Referral to Urology  Mini Johnson is a 54 y.o. female with a diagnosis of renal angiolipoma who was referred to us today by Dr. Jacome.     I personally reviewed the medical records of the patient including the note of the referring physician including the CT images as well as report. CT of kidney preformed on 04/16/2024 showed left renal upper pole partially exophytic 2.3 cm macroscopic fat containing mass compatible with an angiomyolipoma, slightly larger compared to prior CT of 2017.    We discussed continue to watch the patient and do not see a need to move forward with surgery or angioembolization considering the slow rate of growth and the size under 4cm, at which the risk of spontaneous hemorrhage exceeds 50%. Her imaging appears to be consistent. We will follow up with her in 6 months with an MRI of her kidneys ordered for April.     Plan:   MRI of kidneys ordered for April.   Follow up in May    Scribe Attestation  By signing my  name below, I, Zabrina Acosta   attest that this documentation has been prepared under the direction and in the presence of Radha Nuñez MD.

## 2024-09-30 NOTE — LETTER
September 30, 2024     Deborah Jacome MD  [address]  [facility]  [facility] [address]    Patient: Mini Johnson   YOB: 1970   Date of Visit: 9/30/2024       Dear Dr. Deborah Jacome MD:    Thank you for referring Mini Johnson to me for evaluation. Below are my notes for this consultation.  If you have questions, please do not hesitate to call me. I look forward to following your patient along with you.       Sincerely,     Radha Nuñez MD      CC: No Recipients  ______________________________________________________________________________________    Subjective  Patient ID: Mini Johnson is a 54 y.o. female.    HPI  Mini Johnson is a 54 y.o. female with a diagnosis of renal angiolipoma who was referred to us today by Dr. Jacome.     Patient had an increase in her left renal angiolipoma size from 2.2cm in 2022 to 3.1cm in 2023 recorded on ultrasound. We do not have an MRI. CT showed 1.65cm in 2020. CT in the past showed 1.7cm. Last CT shows a 2.3cm size.    CT of kidney 04/16/2024  IMPRESSION:  Left renal upper pole partially exophytic 2.3 cm macroscopic fat containing mass compatible with an angiomyolipoma, slightly larger compared to prior CT of 2017.    Review of Systems  A complete review of systems was performed. All systems are noted to be negative unless indicated in the history of present illness, impression, active problem list, or past histories.    Objective  Physical Exam  NAD, alert  No abdominal scars  No CVA tenderness    Assessment/Plan  Diagnoses and all orders for this visit:  Renal angiolipoma  -     Referral to Urology  Mini Johnson is a 54 y.o. female with a diagnosis of renal angiolipoma who was referred to us today by Dr. Jacome.     I personally reviewed the medical records of the patient including the note of the referring physician including the CT images as well as report. CT of kidney preformed on 04/16/2024 showed left  renal upper pole partially exophytic 2.3 cm macroscopic fat containing mass compatible with an angiomyolipoma, slightly larger compared to prior CT of 2017.    We discussed continue to watch the patient and do not see a need to move forward with surgery or angioembolization considering the slow rate of growth and the size under 4cm, at which the risk of spontaneous hemorrhage exceeds 50%. Her imaging appears to be consistent. We will follow up with her in 6 months with an MRI of her kidneys ordered for April.     Plan:   MRI of kidneys ordered for April.   Follow up in May    Scribe Attestation  By signing my name below, I, Zabrina Acosta   attest that this documentation has been prepared under the direction and in the presence of Radha Nuñez MD.

## 2024-11-19 DIAGNOSIS — E78.2 MIXED HYPERLIPIDEMIA: ICD-10-CM

## 2024-11-19 RX ORDER — ATORVASTATIN CALCIUM 10 MG/1
TABLET, FILM COATED ORAL
Qty: 90 TABLET | Refills: 1 | Status: SHIPPED | OUTPATIENT
Start: 2024-11-19

## 2024-11-19 RX ORDER — FENOFIBRATE 48 MG/1
48 TABLET, FILM COATED ORAL DAILY
Qty: 90 TABLET | Refills: 1 | Status: SHIPPED | OUTPATIENT
Start: 2024-11-19 | End: 2025-05-18

## 2024-11-25 ENCOUNTER — ANCILLARY PROCEDURE (OUTPATIENT)
Dept: URGENT CARE | Age: 54
End: 2024-11-25
Payer: COMMERCIAL

## 2024-11-25 ENCOUNTER — OFFICE VISIT (OUTPATIENT)
Dept: URGENT CARE | Age: 54
End: 2024-11-25
Payer: COMMERCIAL

## 2024-11-25 VITALS
DIASTOLIC BLOOD PRESSURE: 90 MMHG | WEIGHT: 140 LBS | RESPIRATION RATE: 16 BRPM | HEART RATE: 89 BPM | TEMPERATURE: 97.8 F | OXYGEN SATURATION: 98 % | SYSTOLIC BLOOD PRESSURE: 139 MMHG | BODY MASS INDEX: 23.3 KG/M2

## 2024-11-25 DIAGNOSIS — R07.81 RIB PAIN ON LEFT SIDE: ICD-10-CM

## 2024-11-25 DIAGNOSIS — S20.212A RIB CONTUSION, LEFT, INITIAL ENCOUNTER: ICD-10-CM

## 2024-11-25 DIAGNOSIS — R07.81 RIB PAIN ON LEFT SIDE: Primary | ICD-10-CM
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
## 2024-11-25 PROCEDURE — 71100 X-RAY EXAM RIBS UNI 2 VIEWS: CPT | Mod: LEFT SIDE | Performed by: REGISTERED NURSE

## 2024-11-25 RX ORDER — KETOROLAC TROMETHAMINE 10 MG/1
10 TABLET, FILM COATED ORAL EVERY 6 HOURS PRN
Qty: 20 TABLET | Refills: 0 | Status: SHIPPED | OUTPATIENT
Start: 2024-11-25 | End: 2024-11-30

## 2024-11-25 ASSESSMENT — ENCOUNTER SYMPTOMS
COUGH: 0
SHORTNESS OF BREATH: 0
ABDOMINAL PAIN: 0

## 2024-11-25 NOTE — PATIENT INSTRUCTIONS
Tylenol/ibuprofen as needed for pain/discomfort  Do not take ibuprofen if you are taking the Toradol  Take a deep breath 2-3 times an hour to keep lungs inflated  If symptoms persist or worsen, follow up with your pcp

## 2024-11-25 NOTE — PROGRESS NOTES
Subjective   Patient ID: Mini Johnson is a 54 y.o. female. They present today with a chief complaint of Chest Pain (Rib pain Fell yesterday has an bruise on breast and it hurts to breathe).    History of Present Illness  54-year-old female presents with complaints of left sided rib pain.  She reports she had a fall yesterday and believes when she hit the floor she landed on her right fist as her arm was across to her chest.  She states last evening she had a difficult time sleeping related to the pain and she is also experiencing increased tenderness with any deep breath.  She denies any shortness of breath, chest pain, cough, difficulty breathing.      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, initial encounter (Multi) 07/11/2016    Contusion of brain    Headache, unspecified 08/09/2017    Acute intractable headache, unspecified headache type    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    Other specified soft tissue disorders     Limb swelling    Pain in unspecified limb 10/04/2016    Limb pain    Paresthesia of skin 10/06/2016    Tingling    Personal history of nicotine dependence 07/11/2016    Former smoker    Personal history of other (healed) physical injury and trauma     History of sprain of knee    Personal history of other diseases of the digestive system 01/31/2017    History of diverticulitis of colon    Personal history of other diseases of the musculoskeletal system and  connective tissue 05/27/2014    History of muscle spasm    Personal history of other diseases of the musculoskeletal system and connective tissue 08/15/2016    History of neck pain    Personal history of other diseases of the musculoskeletal system and connective tissue 01/08/2017    History of low back pain    Personal history of other infectious and parasitic diseases 12/04/2017    History of herpes labialis    Personal history of other medical treatment 06/24/2016    History of screening mammography    Personal history of other medical treatment     History of screening mammography    Personal history of other specified conditions 01/14/2017    History of diarrhea    Personal history of other specified conditions 10/14/2016    History of numbness    Personal history of other specified conditions 05/27/2014    History of dysuria    Personal history of other specified conditions 06/25/2018    History of bruising easily    Personal history of other specified conditions 06/25/2018    History of wheezing    Personal history of other specified conditions 01/24/2015    History of fatigue    Personal history of other specified conditions 08/08/2018    History of headache    Personal history of other specified conditions     History of fatigue    Personal history of pneumonia (recurrent) 08/08/2018    History of pneumonia    Personal history of traumatic brain injury 10/01/2017    History of concussion    Personal history of traumatic brain injury 12/18/2018    History of closed head injury    Post-surgical hypothyroidism 10/30/2013    S/P hysterectomy 10/02/2014    Vaginal lesion 11/28/2011       Past Surgical History:   Procedure Laterality Date    COLONOSCOPY  09/16/2013    Complete Colonoscopy    DILATION AND CURETTAGE OF UTERUS  09/16/2013    Dilation And Curettage    HYSTERECTOMY  09/16/2013    Hysterectomy    MR HEAD ANGIO WO IV CONTRAST  8/18/2017    MR HEAD ANGIO WO IV CONTRAST 8/18/2017 [facility]     OTHER SURGICAL HISTORY  09/16/2013    Stress Test ECG Performed    OTHER SURGICAL HISTORY  09/16/2013    Biopsy Endometrial, Without Cervical Dilation    TOTAL THYROIDECTOMY  09/16/2013    Thyroid Surgery Total Thyroidectomy        reports that she has been smoking cigarettes. She has a 2 pack-year smoking history. She has been exposed to tobacco smoke. She has never used smokeless tobacco. She reports current alcohol use of about 5.0 standard drinks of alcohol per week. She reports that she does not use drugs.    Review of Systems  Review of Systems   Respiratory:  Negative for cough and shortness of breath.    Cardiovascular:  Positive for chest pain.        Left rib pain   Gastrointestinal:  Negative for abdominal pain.   All other systems reviewed and are negative.                                 Objective    Vitals:    11/25/24 0819   BP: 139/90   BP Location: Left arm   Patient Position: Sitting   BP Cuff Size: Adult   Pulse: 89   Resp: 16   Temp: 36.6 °C (97.8 °F)   TempSrc: Oral   SpO2: 98%   Weight: 63.5 kg (140 lb)     No LMP recorded. Patient has had a hysterectomy.    Physical Exam  Vitals and nursing note reviewed.   Pulmonary:      Effort: Pulmonary effort is normal.      Breath sounds: Normal breath sounds and air entry.   Chest:      Chest wall: Tenderness present. No swelling, crepitus or edema.             Procedures    Point of Care Test & Imaging Results from this visit  No results found for this visit on 11/25/24.   No results found.    Diagnostic study results (if any) were reviewed by Crystal L Severino, APRN-CNP.    Assessment/Plan   Allergies, medications, history, and pertinent labs/EKGs/Imaging reviewed by Crystal L Severino, APRN-CNP.     Medical Decision Making  VSS.  Chest and pulmonary assessment as above.  Left rib xray obtained, no rib fracture identified.  Patient does not appear to be in any respiratory distress, is able to speak in full sentences, and pulse ox is 98% on RA.  At  time of discharge patient was clinically well-appearing and HDS for outpatient management. The patient and/or family was educated regarding diagnosis, supportive care, OTC and Rx medications. The patient and/or family was given the opportunity to ask questions prior to discharge.  They verbalized understanding of my discussion of the plans for treatment, expected course, indications to return to  or seek further evaluation in ED, and the need for timely follow up as directed.   They were provided with a work/school excuse if requested.      Orders and Diagnoses  Diagnoses and all orders for this visit:  Rib pain on left side  -     XR ribs left 2 views; Future  Fall, initial encounter  Instructions given on using ace wrap for support if desired  Tylenol/ibuprofen as needed for pain/discomfort  Take a deep breath 2-3 times an hour     Medical Admin Record      Patient disposition: Home    Electronically signed by Crystal L Severino, APRN-CNP  8:27 AM

## 2024-12-12 ENCOUNTER — APPOINTMENT (OUTPATIENT)
Dept: PRIMARY CARE | Facility: CLINIC | Age: 54
End: 2024-12-12
Payer: COMMERCIAL

## 2025-03-25 ENCOUNTER — HOSPITAL ENCOUNTER (OUTPATIENT)
Dept: RADIOLOGY | Facility: HOSPITAL | Age: 55
Discharge: HOME | End: 2025-03-25
Payer: COMMERCIAL

## 2025-03-25 DIAGNOSIS — D17.71 RENAL ANGIOLIPOMA: ICD-10-CM

## 2025-03-25 PROCEDURE — A9575 INJ GADOTERATE MEGLUMI 0.1ML: HCPCS | Performed by: STUDENT IN AN ORGANIZED HEALTH CARE EDUCATION/TRAINING PROGRAM

## 2025-03-25 PROCEDURE — 74183 MRI ABD W/O CNTR FLWD CNTR: CPT | Performed by: RADIOLOGY

## 2025-03-25 PROCEDURE — 74183 MRI ABD W/O CNTR FLWD CNTR: CPT

## 2025-03-25 PROCEDURE — 2550000001 HC RX 255 CONTRASTS: Performed by: STUDENT IN AN ORGANIZED HEALTH CARE EDUCATION/TRAINING PROGRAM

## 2025-03-25 RX ORDER — GADOTERATE MEGLUMINE 376.9 MG/ML
0.2 INJECTION INTRAVENOUS
Status: COMPLETED | OUTPATIENT
Start: 2025-03-25 | End: 2025-03-25

## 2025-03-25 RX ADMIN — GADOTERATE MEGLUMINE 12 ML: 376.9 INJECTION INTRAVENOUS at 07:59

## 2025-04-03 ENCOUNTER — APPOINTMENT (OUTPATIENT)
Dept: PRIMARY CARE | Facility: CLINIC | Age: 55
End: 2025-04-03
Payer: COMMERCIAL

## 2025-04-03 VITALS
HEART RATE: 102 BPM | WEIGHT: 136 LBS | SYSTOLIC BLOOD PRESSURE: 120 MMHG | OXYGEN SATURATION: 96 % | TEMPERATURE: 97.2 F | HEIGHT: 65 IN | DIASTOLIC BLOOD PRESSURE: 70 MMHG | BODY MASS INDEX: 22.66 KG/M2

## 2025-04-03 DIAGNOSIS — Z79.899 MEDICATION MANAGEMENT: ICD-10-CM

## 2025-04-03 DIAGNOSIS — E03.9 HYPOTHYROIDISM, UNSPECIFIED TYPE: ICD-10-CM

## 2025-04-03 DIAGNOSIS — E55.9 VITAMIN D DEFICIENCY: ICD-10-CM

## 2025-04-03 DIAGNOSIS — G47.00 INSOMNIA, UNSPECIFIED TYPE: ICD-10-CM

## 2025-04-03 DIAGNOSIS — F17.210 CIGARETTE NICOTINE DEPENDENCE WITHOUT COMPLICATION: ICD-10-CM

## 2025-04-03 DIAGNOSIS — E04.2 NONTOXIC MULTINODULAR GOITER: ICD-10-CM

## 2025-04-03 DIAGNOSIS — I73.00 RAYNAUD'S PHENOMENON WITHOUT GANGRENE: ICD-10-CM

## 2025-04-03 DIAGNOSIS — Z00.00 ROUTINE GENERAL MEDICAL EXAMINATION AT A HEALTH CARE FACILITY: Primary | ICD-10-CM

## 2025-04-03 PROCEDURE — 3008F BODY MASS INDEX DOCD: CPT | Performed by: FAMILY MEDICINE

## 2025-04-03 PROCEDURE — 99396 PREV VISIT EST AGE 40-64: CPT | Performed by: FAMILY MEDICINE

## 2025-04-03 RX ORDER — ZOLPIDEM TARTRATE 12.5 MG/1
12.5 TABLET, FILM COATED, EXTENDED RELEASE ORAL NIGHTLY PRN
Qty: 90 TABLET | Refills: 1 | Status: SHIPPED | OUTPATIENT
Start: 2025-04-03 | End: 2025-09-30

## 2025-04-03 ASSESSMENT — ENCOUNTER SYMPTOMS
COUGH: 0
WOUND: 0
HEADACHES: 0
DIZZINESS: 0
STRIDOR: 0
WEAKNESS: 0
FREQUENCY: 0
CHEST TIGHTNESS: 0
DIAPHORESIS: 0
VOICE CHANGE: 0
LIGHT-HEADEDNESS: 0
TROUBLE SWALLOWING: 0
NAUSEA: 0
WHEEZING: 0
HEMATURIA: 0
JOINT SWELLING: 0
SORE THROAT: 0
PALPITATIONS: 0
FATIGUE: 0
UNEXPECTED WEIGHT CHANGE: 0
PALPITATIONS: 1
COLOR CHANGE: 0
NUMBNESS: 0
COUGH: 1
CHILLS: 0
ABDOMINAL PAIN: 0
SINUS PAIN: 0
EYE PAIN: 0
DECREASED CONCENTRATION: 0
DIARRHEA: 0
DIFFICULTY URINATING: 0
NECK STIFFNESS: 0
FEVER: 0
BLOOD IN STOOL: 0
VOMITING: 0
CONFUSION: 0
SLEEP DISTURBANCE: 1
DYSURIA: 0
DYSPHORIC MOOD: 0
ADENOPATHY: 0
SHORTNESS OF BREATH: 0
BRUISES/BLEEDS EASILY: 0
NECK PAIN: 0
TREMORS: 0
ARTHRALGIAS: 0
CONSTIPATION: 0
NERVOUS/ANXIOUS: 0
APPETITE CHANGE: 0
SINUS PRESSURE: 0

## 2025-04-03 ASSESSMENT — PAIN SCALES - GENERAL: PAINLEVEL_OUTOF10: 0-NO PAIN

## 2025-04-03 NOTE — PROGRESS NOTES
Subjective   Patient ID: Mini Johnson is a 54 y.o. female.    Mini Johnson comes in today for physical exam.  There has been no chest pain, shortness of breath, fever, chills, unexplained weight loss, rectal bleeding or any other unusual symptoms. Body mass index is 22.63 kg/m².  Recent labs, diagnostics and pertinent information has been reviewed. Patient is attempting to eat a healthy diet and incorporate exercise in to their lifestyle. Patient smokes.  She quit for about 20 years but in total she has smoked more than 20 pack years.  She is currently smoking for 7 years.  Alcohol in moderation. Patient is practicing routine eye and dental care. Reviewed employment status. No uncontrolled anxiety, depression. Reviewed current medications, if any.          Review of Systems   Constitutional:  Negative for fatigue, fever and unexpected weight change.   HENT:  Negative for congestion, ear pain, nosebleeds, sinus pain and trouble swallowing.    Eyes:  Negative for visual disturbance.   Respiratory:  Negative for cough and shortness of breath.    Cardiovascular:  Negative for chest pain and palpitations.   Gastrointestinal:  Negative for abdominal pain, blood in stool, diarrhea, nausea and vomiting.   Genitourinary:  Negative for dysuria and hematuria.   Musculoskeletal:  Negative for gait problem and joint swelling.   Neurological:  Negative for tremors, weakness and light-headedness.   Hematological:  Does not bruise/bleed easily.   Psychiatric/Behavioral:  Negative for dysphoric mood and suicidal ideas.      Vitals:    04/03/25 0808   BP: 120/70   Pulse: 102   Temp: 36.2 °C (97.2 °F)   SpO2: 96%      Body mass index is 22.63 kg/m².  Objective   Physical Exam  Constitutional:       Appearance: Normal appearance.   HENT:      Head: Normocephalic and atraumatic.      Right Ear: Tympanic membrane and external ear normal.      Left Ear: Tympanic membrane and external ear normal.      Nose: Nose normal.       Mouth/Throat:      Mouth: Mucous membranes are moist.      Pharynx: Oropharynx is clear. No oropharyngeal exudate.   Eyes:      Extraocular Movements: Extraocular movements intact.      Conjunctiva/sclera: Conjunctivae normal.      Pupils: Pupils are equal, round, and reactive to light.   Cardiovascular:      Rate and Rhythm: Normal rate and regular rhythm.      Heart sounds: Normal heart sounds.   Pulmonary:      Effort: Pulmonary effort is normal.      Breath sounds: Normal breath sounds.   Abdominal:      General: Abdomen is flat.      Palpations: Abdomen is soft. There is no mass.      Tenderness: There is no abdominal tenderness. There is no guarding.   Musculoskeletal:      Cervical back: Neck supple.   Lymphadenopathy:      Cervical: No cervical adenopathy.   Skin:     General: Skin is warm and dry.   Neurological:      General: No focal deficit present.      Mental Status: She is alert.   Psychiatric:         Mood and Affect: Mood normal.         Speech: Speech normal.         Behavior: Behavior normal.         Cognition and Memory: Cognition normal.         Last Labs:     CMP:   Lab Results   Component Value Date    CALCIUM 9.6 08/17/2024    CALCIUM 9.2 04/12/2024    PROT 7.1 08/17/2024    PROT 7.0 11/16/2023    ALBUMIN 4.8 08/17/2024    ALBUMIN 4.7 11/16/2023    AST 13 08/17/2024    AST 13 11/16/2023    ALKPHOS 121 (H) 08/17/2024    ALKPHOS 106 11/16/2023    BILITOT 0.8 08/17/2024    BILITOT 0.6 11/16/2023     CBC:   Lab Results   Component Value Date    WBC 8.2 08/17/2024    WBC 6.6 11/16/2023    HGB 13.9 08/17/2024    HGB 12.7 11/16/2023    HCT 42.2 08/17/2024    HCT 40.8 11/16/2023    MCV 98 08/17/2024     (H) 11/16/2023     08/17/2024     11/16/2023     A1C:   Lab Results   Component Value Date    HGBA1C 5.1 08/17/2024    HGBA1C 4.7 11/16/2023     LIPID PANEL:   Lab Results   Component Value Date    CHOL 212 (H) 08/17/2024    CHOL 285 (H) 11/16/2023    TRIG 183 (H) 08/17/2024     "TRIG 330 (H) 11/16/2023    HDL 56.5 08/17/2024    HDL 53.4 11/16/2023    CHHDL 3.8 08/17/2024    CHHDL 5.3 11/16/2023    LDLF 160 (H) 09/04/2020    LDLF 156 (H) 05/02/2019    VLDL 37 08/17/2024    VLDL 66 (H) 11/16/2023    NHDL 156 (H) 08/17/2024    NHDL 232 (H) 11/16/2023     TSH:   Lab Results   Component Value Date    TSH 0.06 (L) 08/17/2024    TSH 2.88 11/16/2023     PSA:   No results found for: \"PSA\"     Assessment/Plan   Diagnoses and all orders for this visit:  Routine general medical examination at a health care facility  -     Comprehensive Metabolic Panel; Future  -     CBC and Auto Differential; Future  -     Lipid Panel; Future  -     Thyroid Stimulating Hormone; Future  -     T3, free; Future  -     Tsh With Reflex To Free T4 If Abnormal; Future  -     Hemoglobin A1C; Future  -     Urinalysis with Reflex Microscopic; Future  -     Vitamin D 25-Hydroxy,Total (for eval of Vitamin D levels); Future      "

## 2025-04-03 NOTE — PATIENT INSTRUCTIONS
It was nice to see you today!  Discussed current concerns and addressed   Reviewed recent labs and diagnostics  Reviewed medications list  Continue to eat a healthy diet, exercise at least 3 times a week or more  Plan and follow up discussed  For any further information related to your condition, copy and paste or go to [email or web address]  CT lung screen

## 2025-04-03 NOTE — PROGRESS NOTES
Subjective   Patient ID: Mini Johnson is a 54 y.o. female who presents for Annual Exam.    Mini is a 54-year-old female presenting for her annual physical. She reports overall she is feeling well, but has been having difficulty sleeping. She has no issues falling asleep but struggles to fall back asleep when woken throughout the night. She takes melatonin which has not helped significantly, and she used to take Ambien XR for which she is requesting a new prescription. She cooks at home, eating a balanced diet, and drinks alcohol in moderation. She is a current smoker; she smoked in the 1980s-90s and quit in 1999 and has been smoking again for the past number of years with a cumulative smoking history of over 20 pack-years. She does not have a current exercise routine but she is active and on her feet, going up and down stairs, a lot at work. She wears contact lenses and has regular eye exams and sees the dentist twice a year. She notes she occasionally has a non-productive cough related to her smoking and intermittently has asymptomatic palpitations that occur randomly throughout the day. She denies shortness of breath, dyspnea, hemoptysis, chest pain, night sweats, malaise, fatigue, chills, fevers, significant weight changes, and any appetite changes.     Review of Systems   Constitutional:  Negative for appetite change, chills, diaphoresis, fatigue, fever and unexpected weight change.   HENT:  Negative for congestion, dental problem, nosebleeds, sinus pressure, sore throat, trouble swallowing and voice change.    Eyes:  Negative for pain and visual disturbance.   Respiratory:  Positive for cough. Negative for chest tightness, shortness of breath, wheezing and stridor.    Cardiovascular:  Positive for palpitations. Negative for chest pain and leg swelling.   Gastrointestinal:  Negative for abdominal pain, blood in stool, constipation, diarrhea, nausea and vomiting.   Endocrine: Negative for cold  "intolerance and heat intolerance.   Genitourinary:  Negative for difficulty urinating, dysuria, frequency, hematuria, pelvic pain and urgency.   Musculoskeletal:  Negative for arthralgias, joint swelling, neck pain and neck stiffness.   Skin:  Negative for color change, pallor, rash and wound.   Neurological:  Negative for dizziness, tremors, syncope, weakness, light-headedness, numbness and headaches.   Hematological:  Negative for adenopathy. Does not bruise/bleed easily.   Psychiatric/Behavioral:  Positive for sleep disturbance. Negative for behavioral problems, confusion, decreased concentration, dysphoric mood, self-injury and suicidal ideas. The patient is not nervous/anxious.        Objective   /70   Pulse 102   Temp 36.2 °C (97.2 °F)   Ht 1.651 m (5' 5\")   Wt 61.7 kg (136 lb)   SpO2 96%   BMI 22.63 kg/m²     Physical Exam  Constitutional:       Appearance: Normal appearance. She is normal weight. She is not ill-appearing or diaphoretic.   HENT:      Head: Normocephalic.      Right Ear: Tympanic membrane, ear canal and external ear normal.      Left Ear: Tympanic membrane, ear canal and external ear normal.      Nose: Nose normal.      Mouth/Throat:      Mouth: Mucous membranes are moist.      Pharynx: Oropharynx is clear.   Eyes:      Extraocular Movements: Extraocular movements intact.      Conjunctiva/sclera: Conjunctivae normal.      Pupils: Pupils are equal, round, and reactive to light.   Cardiovascular:      Rate and Rhythm: Normal rate and regular rhythm.      Pulses: Normal pulses.      Heart sounds: Normal heart sounds. No murmur heard.     No friction rub. No gallop.   Pulmonary:      Effort: Pulmonary effort is normal. No respiratory distress.      Breath sounds: Normal breath sounds. No stridor. No wheezing, rhonchi or rales.      Comments: Vesicular breath sounds; all lobes clear to auscultation.   Abdominal:      General: Abdomen is flat. Bowel sounds are normal. There is no " distension.      Palpations: Abdomen is soft. There is no mass.      Tenderness: There is no abdominal tenderness. There is no guarding or rebound.      Hernia: No hernia is present.   Musculoskeletal:         General: No swelling, tenderness or signs of injury. Normal range of motion.      Cervical back: Normal range of motion and neck supple. No rigidity or tenderness.   Lymphadenopathy:      Cervical: No cervical adenopathy.   Skin:     General: Skin is warm and dry.      Capillary Refill: Capillary refill takes less than 2 seconds.      Coloration: Skin is not pale.      Findings: No bruising, erythema, lesion or rash.   Neurological:      General: No focal deficit present.      Mental Status: She is alert and oriented to person, place, and time.   Psychiatric:         Mood and Affect: Mood normal.         Behavior: Behavior normal.         Thought Content: Thought content normal.         Judgment: Judgment normal.         Assessment/Plan   Diagnoses and all orders for this visit:  Routine general medical examination at a health care facility  -     Comprehensive Metabolic Panel; Future  -     CBC and Auto Differential; Future  -     Lipid Panel; Future  -     Thyroid Stimulating Hormone; Future  -     T3, free; Future  -     Tsh With Reflex To Free T4 If Abnormal; Future  -     Hemoglobin A1C; Future  -     Urinalysis with Reflex Microscopic; Future  -     Vitamin D 25-Hydroxy,Total (for eval of Vitamin D levels); Future  -     zolpidem CR (Ambien CR) 12.5 mg ER tablet; Take 1 tablet (12.5 mg) by mouth as needed at bedtime for sleep. Do not crush, chew, or split.  -     Reviewed vaccination recommendations which patient currently declined.  -     Colonoscopy up to date - will need in 2026  Cigarette nicotine dependence without complication  -     CT lung screening low dose; Future  Medication management  -     Drug Screen, Urine With Reflex to Confirmation; Future  Raynaud's phenomenon without  gangrene  Hypothyroidism, unspecified type  Vitamin D deficiency  Nontoxic multinodular goiter  Insomnia, unspecified type         -   zolpidem CR (Ambien CR) 12.5 mg ER tablet; Take 1 tablet (12.5 mg) by mouth as needed at bedtime for sleep. Do not crush, chew, or split.

## 2025-04-09 LAB
APPEARANCE UR: CLEAR
BACTERIA #/AREA URNS HPF: ABNORMAL /HPF
BILIRUB UR QL STRIP: NEGATIVE
COLOR UR: YELLOW
GLUCOSE UR QL STRIP: NEGATIVE
HGB UR QL STRIP: NEGATIVE
HYALINE CASTS #/AREA URNS LPF: ABNORMAL /LPF
KETONES UR QL STRIP: NEGATIVE
LEUKOCYTE ESTERASE UR QL STRIP: NEGATIVE
NITRITE UR QL STRIP: NEGATIVE
PH UR STRIP: 5.5 [PH] (ref 5–8)
PROT UR QL STRIP: ABNORMAL
RBC #/AREA URNS HPF: ABNORMAL /HPF
SERVICE CMNT-IMP: ABNORMAL
SP GR UR STRIP: 1.02 (ref 1–1.03)
SQUAMOUS #/AREA URNS HPF: ABNORMAL /HPF
WBC #/AREA URNS HPF: ABNORMAL /HPF

## 2025-04-17 ENCOUNTER — HOSPITAL ENCOUNTER (OUTPATIENT)
Dept: RADIOLOGY | Facility: HOSPITAL | Age: 55
Discharge: HOME | End: 2025-04-17
Payer: COMMERCIAL

## 2025-04-17 DIAGNOSIS — F17.210 CIGARETTE NICOTINE DEPENDENCE WITHOUT COMPLICATION: ICD-10-CM

## 2025-04-17 PROCEDURE — 71271 CT THORAX LUNG CANCER SCR C-: CPT

## 2025-04-18 LAB
AMPHETAMINES UR QL: NEGATIVE NG/ML
BARBITURATES UR QL: NEGATIVE NG/ML
BENZODIAZ UR QL: NEGATIVE NG/ML
BZE UR QL: NEGATIVE NG/ML
CREAT UR-MCNC: 217 MG/DL
FENTANYL UR QL SCN: NEGATIVE NG/ML
METHADONE UR QL: NEGATIVE NG/ML
OPIATES UR QL: NEGATIVE NG/ML
OXIDANTS UR QL: NEGATIVE MCG/ML
OXYCODONE UR QL: NEGATIVE NG/ML
PCP UR QL: NEGATIVE NG/ML
PH UR: 5.5 [PH] (ref 4.5–9)
QUEST NOTES AND COMMENTS: NORMAL
THC UR QL: NEGATIVE NG/ML

## 2025-05-18 NOTE — PROGRESS NOTES
Subjective   Patient ID: Mini Johnson is a 54 y.o. female.      HPI  54 y.o. female presents for a follow up visit regarding a diagnosis of renal angiomyolipoma. She underwent an MRI of the kidney on 03/25/2025, she returns for the results.     Patient had an increase in her left renal angiolipoma size from 2.2cm in 2022 to 3.1cm in 2023 recorded on ultrasound. CT showed 1.65cm in 2020. CT in the past showed 1.7cm. Last CT shows a 2.3cm size.       MR KIDNEY W AND WO IV CONTRAST; 3/25/2025   IMPRESSION:  Similar size of angiomyolipoma in the upper pole left kidney.    CT of kidney 04/16/2024  IMPRESSION:  Left renal upper pole partially exophytic 2.3 cm macroscopic fat containing mass compatible with an angiomyolipoma, slightly larger compared to prior CT of 2017.    Review of Systems  A complete review of systems was performed. All systems are noted to be negative unless indicated in the history of present illness, impression, active problem list, or past histories.     Objective   Physical Exam  No CVA tenderness    Assessment/Plan   Diagnoses and all orders for this visit:  Renal angiolipoma  -     CT kidney w IV contrast; Future  -     Creatinine; Future      54 y.o. female presents for a follow up visit regarding a diagnosis of renal angiomyolipoma. She underwent an MRI of the kidney on 03/25/2025, she returns for the results.     I personally reviewed the MRI of the kidney that was completed on 03/25/2025. The results indicated the size has not changed since her last imaging. The imaging results are reassuring as the renal angiomyolipoma is stable.     I have reviewed with the patient that based upon how the angiomyolipoma is growing, indicates if it is cancerous. I have indicated that at this time, the growth is not indicative of malignancy.     I have advised the patient that her angiomyolipoma is less than 4 cm, which indicates a small renal mass. When the mass exceeds 4 cm, it can spontaneously begin  bleeding, the possibility can be up to 50%.     My recommendation would be for her to monitor the angiomyolipoma every year. She is able to alternate between CT imaging and MRI imaging due to cost and radiation.     Plan:  Continue surveillance of left angiomyolipoma with CT kidneys with contrast 04/2026  FUV with urology afterwards     Scribe Attestation   By signing my name below, IMarianela, Scribe attestation that this documentation has been prepared under the direction and in the presence of Radha Nuñez MD.

## 2025-05-19 ENCOUNTER — APPOINTMENT (OUTPATIENT)
Dept: UROLOGY | Facility: CLINIC | Age: 55
End: 2025-05-19
Payer: COMMERCIAL

## 2025-05-19 DIAGNOSIS — D17.71 RENAL ANGIOLIPOMA: ICD-10-CM

## 2025-05-19 PROCEDURE — 99214 OFFICE O/P EST MOD 30 MIN: CPT | Performed by: STUDENT IN AN ORGANIZED HEALTH CARE EDUCATION/TRAINING PROGRAM

## 2025-05-23 ENCOUNTER — OFFICE VISIT (OUTPATIENT)
Dept: PRIMARY CARE | Facility: CLINIC | Age: 55
End: 2025-05-23
Payer: COMMERCIAL

## 2025-05-23 ENCOUNTER — HOSPITAL ENCOUNTER (OUTPATIENT)
Dept: RADIOLOGY | Facility: CLINIC | Age: 55
Discharge: HOME | End: 2025-05-23
Payer: COMMERCIAL

## 2025-05-23 VITALS
OXYGEN SATURATION: 98 % | BODY MASS INDEX: 22.5 KG/M2 | SYSTOLIC BLOOD PRESSURE: 114 MMHG | HEART RATE: 102 BPM | HEIGHT: 66 IN | DIASTOLIC BLOOD PRESSURE: 72 MMHG | TEMPERATURE: 97.7 F | WEIGHT: 140 LBS

## 2025-05-23 DIAGNOSIS — R10.9 FLANK PAIN: ICD-10-CM

## 2025-05-23 DIAGNOSIS — R10.9 FLANK PAIN: Primary | ICD-10-CM

## 2025-05-23 DIAGNOSIS — R31.9 HEMATURIA, UNSPECIFIED TYPE: ICD-10-CM

## 2025-05-23 LAB
POC APPEARANCE, URINE: ABNORMAL
POC BILIRUBIN, URINE: NEGATIVE
POC BLOOD, URINE: ABNORMAL
POC COLOR, URINE: YELLOW
POC GLUCOSE, URINE: NEGATIVE MG/DL
POC KETONES, URINE: NEGATIVE MG/DL
POC LEUKOCYTES, URINE: NEGATIVE
POC NITRITE,URINE: NEGATIVE
POC PH, URINE: 6 PH
POC PROTEIN, URINE: NEGATIVE MG/DL
POC SPECIFIC GRAVITY, URINE: 1.01
POC UROBILINOGEN, URINE: 0.2 EU/DL

## 2025-05-23 PROCEDURE — 3008F BODY MASS INDEX DOCD: CPT | Performed by: FAMILY MEDICINE

## 2025-05-23 PROCEDURE — 74176 CT ABD & PELVIS W/O CONTRAST: CPT

## 2025-05-23 PROCEDURE — 99213 OFFICE O/P EST LOW 20 MIN: CPT | Performed by: FAMILY MEDICINE

## 2025-05-23 PROCEDURE — 81003 URINALYSIS AUTO W/O SCOPE: CPT | Performed by: FAMILY MEDICINE

## 2025-05-23 RX ORDER — TAMSULOSIN HYDROCHLORIDE 0.4 MG/1
0.4 CAPSULE ORAL DAILY
Qty: 30 CAPSULE | Refills: 11 | Status: SHIPPED | OUTPATIENT
Start: 2025-05-23 | End: 2026-05-23

## 2025-05-23 ASSESSMENT — ENCOUNTER SYMPTOMS
TREMORS: 0
VOMITING: 0
DYSPHORIC MOOD: 0
SINUS PAIN: 0
COUGH: 0
DIARRHEA: 0
DYSURIA: 0
JOINT SWELLING: 0
FATIGUE: 0
NAUSEA: 0
SHORTNESS OF BREATH: 0
TROUBLE SWALLOWING: 0
WEAKNESS: 0
LIGHT-HEADEDNESS: 0
HEMATURIA: 0
UNEXPECTED WEIGHT CHANGE: 0
BLOOD IN STOOL: 0
FEVER: 0
ABDOMINAL PAIN: 1
BRUISES/BLEEDS EASILY: 0
PALPITATIONS: 0

## 2025-05-23 NOTE — PATIENT INSTRUCTIONS
Start Flomax.  Drink lots of water, no pop, juice, coffee, tea or anything else but water.  Will try and get a stat CT scan if pain gets worse and unable to get scan she will have to go to the emergency room but hopefully she will pass it.  Precautions discussed.

## 2025-05-23 NOTE — PROGRESS NOTES
Subjective   Patient ID: Mini Johnson is a 54 y.o. female.    Patient comes in with a few days of gross hematuria and right lower back pain.  It was fairly severe and she took ibuprofen to sleep last night.  No history of nephrolithiasis.  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  She is not nauseated.  She has had no fever.  The pain is continuous and worsens with movement or deep breathing.  She is a smoker but no history of blood clot.  She has no cough but the pain seems to be at a lower level than where the lungs are.  No dysuria or frequency        Review of Systems   Constitutional:  Negative for fatigue, fever and unexpected weight change.   HENT:  Negative for congestion, ear pain, nosebleeds, sinus pain and trouble swallowing.    Eyes:  Negative for visual disturbance.   Respiratory:  Negative for cough and shortness of breath.    Cardiovascular:  Negative for chest pain and palpitations.   Gastrointestinal:  Positive for abdominal pain. Negative for blood in stool, diarrhea, nausea and vomiting.   Genitourinary:  Negative for dysuria and hematuria.   Musculoskeletal:  Negative for gait problem and joint swelling.   Neurological:  Negative for tremors, weakness and light-headedness.   Hematological:  Does not bruise/bleed easily.   Psychiatric/Behavioral:  Negative for dysphoric mood and suicidal ideas.      Vitals:    05/23/25 1131   BP: 114/72   Pulse: 102   Temp: 36.5 °C (97.7 °F)   SpO2: 98%      Body mass index is 22.6 kg/m².  Objective   Physical Exam  Constitutional:       Appearance: Normal appearance.   Cardiovascular:      Rate and Rhythm: Normal rate and regular rhythm.      Heart sounds: Normal heart sounds.   Pulmonary:      Effort: Pulmonary effort is normal.      Breath sounds: Normal breath sounds.   Abdominal:      Palpations: There is no mass.      Tenderness: There is no abdominal tenderness. There is right CVA tenderness. There is no left CVA tenderness.  "  Musculoskeletal:      Cervical back: Neck supple.      Right lower leg: No edema.      Left lower leg: No edema.   Skin:     General: Skin is warm and dry.   Neurological:      General: No focal deficit present.      Mental Status: She is alert.   Psychiatric:         Mood and Affect: Mood normal.         Speech: Speech normal.         Behavior: Behavior normal.         Cognition and Memory: Cognition normal.         Last Labs:     CMP:   Lab Results   Component Value Date    CALCIUM 9.6 08/17/2024    CALCIUM 9.2 04/12/2024    PROT 7.1 08/17/2024    PROT 7.0 11/16/2023    ALBUMIN 4.8 08/17/2024    ALBUMIN 4.7 11/16/2023    AST 13 08/17/2024    AST 13 11/16/2023    ALKPHOS 121 (H) 08/17/2024    ALKPHOS 106 11/16/2023    BILITOT 0.8 08/17/2024    BILITOT 0.6 11/16/2023     CBC:   Lab Results   Component Value Date    WBC 8.2 08/17/2024    WBC 6.6 11/16/2023    HGB 13.9 08/17/2024    HGB 12.7 11/16/2023    HCT 42.2 08/17/2024    HCT 40.8 11/16/2023    MCV 98 08/17/2024     (H) 11/16/2023     08/17/2024     11/16/2023     A1C:   Lab Results   Component Value Date    HGBA1C 5.1 08/17/2024    HGBA1C 4.7 11/16/2023     LIPID PANEL:   Lab Results   Component Value Date    CHOL 212 (H) 08/17/2024    CHOL 285 (H) 11/16/2023    TRIG 183 (H) 08/17/2024    TRIG 330 (H) 11/16/2023    HDL 56.5 08/17/2024    HDL 53.4 11/16/2023    CHHDL 3.8 08/17/2024    CHHDL 5.3 11/16/2023    LDLF 160 (H) 09/04/2020    LDLF 156 (H) 05/02/2019    VLDL 37 08/17/2024    VLDL 66 (H) 11/16/2023    NHDL 156 (H) 08/17/2024    NHDL 232 (H) 11/16/2023     TSH:   Lab Results   Component Value Date    TSH 0.06 (L) 08/17/2024    TSH 2.88 11/16/2023     PSA:   No results found for: \"PSA\"     Assessment/Plan   Diagnoses and all orders for this visit:  Flank pain  -     CT abdomen pelvis wo IV contrast; Future  -     tamsulosin (Flomax) 0.4 mg 24 hr capsule; Take 1 capsule (0.4 mg) by mouth once daily. Do not crush, chew, or " split.  Hematuria, unspecified type  -     POCT UA Automated manually resulted  -     CT abdomen pelvis wo IV contrast; Future  -     tamsulosin (Flomax) 0.4 mg 24 hr capsule; Take 1 capsule (0.4 mg) by mouth once daily. Do not crush, chew, or split.

## 2025-05-24 ENCOUNTER — APPOINTMENT (OUTPATIENT)
Dept: CARDIOLOGY | Facility: HOSPITAL | Age: 55
End: 2025-05-24
Payer: COMMERCIAL

## 2025-05-24 ENCOUNTER — APPOINTMENT (OUTPATIENT)
Dept: RADIOLOGY | Facility: HOSPITAL | Age: 55
End: 2025-05-24
Payer: COMMERCIAL

## 2025-05-24 ENCOUNTER — HOSPITAL ENCOUNTER (EMERGENCY)
Facility: HOSPITAL | Age: 55
Discharge: HOME | End: 2025-05-24
Attending: EMERGENCY MEDICINE
Payer: COMMERCIAL

## 2025-05-24 ENCOUNTER — HOSPITAL ENCOUNTER (OUTPATIENT)
Facility: HOSPITAL | Age: 55
Setting detail: OBSERVATION
Discharge: HOME | End: 2025-05-25
Attending: EMERGENCY MEDICINE | Admitting: INTERNAL MEDICINE
Payer: COMMERCIAL

## 2025-05-24 VITALS
TEMPERATURE: 99.7 F | HEIGHT: 66 IN | DIASTOLIC BLOOD PRESSURE: 84 MMHG | HEART RATE: 77 BPM | SYSTOLIC BLOOD PRESSURE: 124 MMHG | OXYGEN SATURATION: 97 % | WEIGHT: 140 LBS | BODY MASS INDEX: 22.5 KG/M2 | RESPIRATION RATE: 17 BRPM

## 2025-05-24 DIAGNOSIS — Z78.9 FAILURE OF OUTPATIENT TREATMENT: ICD-10-CM

## 2025-05-24 DIAGNOSIS — N63.0 BREAST NODULE: ICD-10-CM

## 2025-05-24 DIAGNOSIS — M54.50 RIGHT-SIDED LOW BACK PAIN WITHOUT SCIATICA, UNSPECIFIED CHRONICITY: Primary | ICD-10-CM

## 2025-05-24 DIAGNOSIS — R91.8 LUNG NODULES: ICD-10-CM

## 2025-05-24 DIAGNOSIS — M54.50 ACUTE RIGHT-SIDED LOW BACK PAIN WITHOUT SCIATICA: ICD-10-CM

## 2025-05-24 DIAGNOSIS — K76.0 HEPATIC STEATOSIS: ICD-10-CM

## 2025-05-24 DIAGNOSIS — R10.11 RUQ ABDOMINAL PAIN: Primary | ICD-10-CM

## 2025-05-24 DIAGNOSIS — Z72.0 TOBACCO USE: ICD-10-CM

## 2025-05-24 LAB
ALBUMIN SERPL BCP-MCNC: 4.5 G/DL (ref 3.4–5)
ALP SERPL-CCNC: 118 U/L (ref 33–110)
ALT SERPL W P-5'-P-CCNC: 22 U/L (ref 7–45)
ANION GAP SERPL CALC-SCNC: 15 MMOL/L (ref 10–20)
ANION GAP SERPL CALC-SCNC: 16 MMOL/L (ref 10–20)
APPEARANCE UR: CLEAR
AST SERPL W P-5'-P-CCNC: 28 U/L (ref 9–39)
BASOPHILS # BLD AUTO: 0.01 X10*3/UL (ref 0–0.1)
BASOPHILS # BLD AUTO: 0.02 X10*3/UL (ref 0–0.1)
BASOPHILS NFR BLD AUTO: 0.1 %
BASOPHILS NFR BLD AUTO: 0.2 %
BILIRUB SERPL-MCNC: 0.5 MG/DL (ref 0–1.2)
BILIRUB UR STRIP.AUTO-MCNC: NEGATIVE MG/DL
BUN SERPL-MCNC: 15 MG/DL (ref 6–23)
BUN SERPL-MCNC: 16 MG/DL (ref 6–23)
CALCIUM SERPL-MCNC: 8.5 MG/DL (ref 8.6–10.3)
CALCIUM SERPL-MCNC: 9.3 MG/DL (ref 8.6–10.3)
CARDIAC TROPONIN I PNL SERPL HS: 5 NG/L (ref 0–13)
CHLORIDE SERPL-SCNC: 97 MMOL/L (ref 98–107)
CHLORIDE SERPL-SCNC: 99 MMOL/L (ref 98–107)
CO2 SERPL-SCNC: 22 MMOL/L (ref 21–32)
CO2 SERPL-SCNC: 25 MMOL/L (ref 21–32)
COLOR UR: COLORLESS
CREAT SERPL-MCNC: 0.97 MG/DL (ref 0.5–1.05)
CREAT SERPL-MCNC: 1.1 MG/DL (ref 0.5–1.05)
D DIMER PPP FEU-MCNC: 933 NG/ML FEU
EGFRCR SERPLBLD CKD-EPI 2021: 59 ML/MIN/1.73M*2
EGFRCR SERPLBLD CKD-EPI 2021: 69 ML/MIN/1.73M*2
EOSINOPHIL # BLD AUTO: 0.07 X10*3/UL (ref 0–0.7)
EOSINOPHIL # BLD AUTO: 0.11 X10*3/UL (ref 0–0.7)
EOSINOPHIL NFR BLD AUTO: 0.7 %
EOSINOPHIL NFR BLD AUTO: 1.2 %
ERYTHROCYTE [DISTWIDTH] IN BLOOD BY AUTOMATED COUNT: 12.1 % (ref 11.5–14.5)
ERYTHROCYTE [DISTWIDTH] IN BLOOD BY AUTOMATED COUNT: 12.3 % (ref 11.5–14.5)
GLUCOSE SERPL-MCNC: 102 MG/DL (ref 74–99)
GLUCOSE SERPL-MCNC: 108 MG/DL (ref 74–99)
GLUCOSE UR STRIP.AUTO-MCNC: NORMAL MG/DL
HCT VFR BLD AUTO: 32.9 % (ref 36–46)
HCT VFR BLD AUTO: 39 % (ref 36–46)
HGB BLD-MCNC: 11.5 G/DL (ref 12–16)
HGB BLD-MCNC: 13.4 G/DL (ref 12–16)
HOLD SPECIMEN: NORMAL
IMM GRANULOCYTES # BLD AUTO: 0.03 X10*3/UL (ref 0–0.7)
IMM GRANULOCYTES # BLD AUTO: 0.05 X10*3/UL (ref 0–0.7)
IMM GRANULOCYTES NFR BLD AUTO: 0.3 % (ref 0–0.9)
IMM GRANULOCYTES NFR BLD AUTO: 0.5 % (ref 0–0.9)
KETONES UR STRIP.AUTO-MCNC: NEGATIVE MG/DL
LACTATE SERPL-SCNC: 1.2 MMOL/L (ref 0.4–2)
LEUKOCYTE ESTERASE UR QL STRIP.AUTO: NEGATIVE
LIPASE SERPL-CCNC: 29 U/L (ref 9–82)
LYMPHOCYTES # BLD AUTO: 0.99 X10*3/UL (ref 1.2–4.8)
LYMPHOCYTES # BLD AUTO: 1.35 X10*3/UL (ref 1.2–4.8)
LYMPHOCYTES NFR BLD AUTO: 10.7 %
LYMPHOCYTES NFR BLD AUTO: 13.8 %
MCH RBC QN AUTO: 33.7 PG (ref 26–34)
MCH RBC QN AUTO: 34 PG (ref 26–34)
MCHC RBC AUTO-ENTMCNC: 34.4 G/DL (ref 32–36)
MCHC RBC AUTO-ENTMCNC: 35 G/DL (ref 32–36)
MCV RBC AUTO: 97 FL (ref 80–100)
MCV RBC AUTO: 98 FL (ref 80–100)
MONOCYTES # BLD AUTO: 1.19 X10*3/UL (ref 0.1–1)
MONOCYTES # BLD AUTO: 1.25 X10*3/UL (ref 0.1–1)
MONOCYTES NFR BLD AUTO: 12.8 %
MONOCYTES NFR BLD AUTO: 12.9 %
NEUTROPHILS # BLD AUTO: 6.86 X10*3/UL (ref 1.2–7.7)
NEUTROPHILS # BLD AUTO: 7.09 X10*3/UL (ref 1.2–7.7)
NEUTROPHILS NFR BLD AUTO: 72.3 %
NEUTROPHILS NFR BLD AUTO: 74.5 %
NITRITE UR QL STRIP.AUTO: NEGATIVE
NRBC BLD-RTO: 0 /100 WBCS (ref 0–0)
NRBC BLD-RTO: 0 /100 WBCS (ref 0–0)
PH UR STRIP.AUTO: 6 [PH]
PLATELET # BLD AUTO: 206 X10*3/UL (ref 150–450)
PLATELET # BLD AUTO: 242 X10*3/UL (ref 150–450)
POTASSIUM SERPL-SCNC: 4 MMOL/L (ref 3.5–5.3)
POTASSIUM SERPL-SCNC: 4.2 MMOL/L (ref 3.5–5.3)
PROT SERPL-MCNC: 7.6 G/DL (ref 6.4–8.2)
PROT UR STRIP.AUTO-MCNC: NEGATIVE MG/DL
RBC # BLD AUTO: 3.38 X10*6/UL (ref 4–5.2)
RBC # BLD AUTO: 3.98 X10*6/UL (ref 4–5.2)
RBC # UR STRIP.AUTO: ABNORMAL MG/DL
RBC #/AREA URNS AUTO: NORMAL /HPF
SODIUM SERPL-SCNC: 133 MMOL/L (ref 136–145)
SODIUM SERPL-SCNC: 133 MMOL/L (ref 136–145)
SP GR UR STRIP.AUTO: 1.01
SQUAMOUS #/AREA URNS AUTO: NORMAL /HPF
UROBILINOGEN UR STRIP.AUTO-MCNC: NORMAL MG/DL
WBC # BLD AUTO: 9.2 X10*3/UL (ref 4.4–11.3)
WBC # BLD AUTO: 9.8 X10*3/UL (ref 4.4–11.3)
WBC #/AREA URNS AUTO: NORMAL /HPF

## 2025-05-24 PROCEDURE — 84484 ASSAY OF TROPONIN QUANT: CPT | Performed by: EMERGENCY MEDICINE

## 2025-05-24 PROCEDURE — 96375 TX/PRO/DX INJ NEW DRUG ADDON: CPT | Mod: 59

## 2025-05-24 PROCEDURE — 99285 EMERGENCY DEPT VISIT HI MDM: CPT | Mod: 25 | Performed by: EMERGENCY MEDICINE

## 2025-05-24 PROCEDURE — 96376 TX/PRO/DX INJ SAME DRUG ADON: CPT | Performed by: EMERGENCY MEDICINE

## 2025-05-24 PROCEDURE — 83605 ASSAY OF LACTIC ACID: CPT | Performed by: EMERGENCY MEDICINE

## 2025-05-24 PROCEDURE — 76705 ECHO EXAM OF ABDOMEN: CPT | Performed by: STUDENT IN AN ORGANIZED HEALTH CARE EDUCATION/TRAINING PROGRAM

## 2025-05-24 PROCEDURE — 93005 ELECTROCARDIOGRAM TRACING: CPT

## 2025-05-24 PROCEDURE — 2500000001 HC RX 250 WO HCPCS SELF ADMINISTERED DRUGS (ALT 637 FOR MEDICARE OP): Performed by: STUDENT IN AN ORGANIZED HEALTH CARE EDUCATION/TRAINING PROGRAM

## 2025-05-24 PROCEDURE — 80048 BASIC METABOLIC PNL TOTAL CA: CPT | Mod: CCI | Performed by: EMERGENCY MEDICINE

## 2025-05-24 PROCEDURE — 96361 HYDRATE IV INFUSION ADD-ON: CPT | Performed by: EMERGENCY MEDICINE

## 2025-05-24 PROCEDURE — 80053 COMPREHEN METABOLIC PANEL: CPT | Performed by: EMERGENCY MEDICINE

## 2025-05-24 PROCEDURE — 81001 URINALYSIS AUTO W/SCOPE: CPT | Performed by: EMERGENCY MEDICINE

## 2025-05-24 PROCEDURE — 2500000005 HC RX 250 GENERAL PHARMACY W/O HCPCS: Performed by: STUDENT IN AN ORGANIZED HEALTH CARE EDUCATION/TRAINING PROGRAM

## 2025-05-24 PROCEDURE — 71275 CT ANGIOGRAPHY CHEST: CPT | Performed by: RADIOLOGY

## 2025-05-24 PROCEDURE — 71275 CT ANGIOGRAPHY CHEST: CPT

## 2025-05-24 PROCEDURE — 85379 FIBRIN DEGRADATION QUANT: CPT | Performed by: EMERGENCY MEDICINE

## 2025-05-24 PROCEDURE — 85025 COMPLETE CBC W/AUTO DIFF WBC: CPT | Mod: 91 | Performed by: EMERGENCY MEDICINE

## 2025-05-24 PROCEDURE — 2500000004 HC RX 250 GENERAL PHARMACY W/ HCPCS (ALT 636 FOR OP/ED): Mod: JZ | Performed by: EMERGENCY MEDICINE

## 2025-05-24 PROCEDURE — 85025 COMPLETE CBC W/AUTO DIFF WBC: CPT | Performed by: EMERGENCY MEDICINE

## 2025-05-24 PROCEDURE — 96374 THER/PROPH/DIAG INJ IV PUSH: CPT | Performed by: EMERGENCY MEDICINE

## 2025-05-24 PROCEDURE — 2550000001 HC RX 255 CONTRASTS: Performed by: EMERGENCY MEDICINE

## 2025-05-24 PROCEDURE — 83690 ASSAY OF LIPASE: CPT | Performed by: EMERGENCY MEDICINE

## 2025-05-24 PROCEDURE — 76705 ECHO EXAM OF ABDOMEN: CPT

## 2025-05-24 PROCEDURE — 36415 COLL VENOUS BLD VENIPUNCTURE: CPT | Performed by: EMERGENCY MEDICINE

## 2025-05-24 PROCEDURE — 2500000004 HC RX 250 GENERAL PHARMACY W/ HCPCS (ALT 636 FOR OP/ED): Mod: JZ | Performed by: HEALTH CARE PROVIDER

## 2025-05-24 PROCEDURE — 2500000004 HC RX 250 GENERAL PHARMACY W/ HCPCS (ALT 636 FOR OP/ED): Mod: JZ | Performed by: STUDENT IN AN ORGANIZED HEALTH CARE EDUCATION/TRAINING PROGRAM

## 2025-05-24 PROCEDURE — 96375 TX/PRO/DX INJ NEW DRUG ADDON: CPT | Performed by: EMERGENCY MEDICINE

## 2025-05-24 PROCEDURE — 99285 EMERGENCY DEPT VISIT HI MDM: CPT | Performed by: EMERGENCY MEDICINE

## 2025-05-24 RX ORDER — ACETAMINOPHEN 325 MG/1
975 TABLET ORAL ONCE
Status: COMPLETED | OUTPATIENT
Start: 2025-05-24 | End: 2025-05-24

## 2025-05-24 RX ORDER — KETOROLAC TROMETHAMINE 15 MG/ML
15 INJECTION, SOLUTION INTRAMUSCULAR; INTRAVENOUS ONCE
Status: COMPLETED | OUTPATIENT
Start: 2025-05-24 | End: 2025-05-24

## 2025-05-24 RX ORDER — LIDOCAINE 560 MG/1
1 PATCH PERCUTANEOUS; TOPICAL; TRANSDERMAL ONCE
Status: DISCONTINUED | OUTPATIENT
Start: 2025-05-24 | End: 2025-05-24 | Stop reason: HOSPADM

## 2025-05-24 RX ORDER — MORPHINE SULFATE 4 MG/ML
4 INJECTION INTRAVENOUS ONCE
Status: COMPLETED | OUTPATIENT
Start: 2025-05-24 | End: 2025-05-24

## 2025-05-24 RX ORDER — ONDANSETRON HYDROCHLORIDE 2 MG/ML
4 INJECTION, SOLUTION INTRAVENOUS ONCE
Status: COMPLETED | OUTPATIENT
Start: 2025-05-24 | End: 2025-05-24

## 2025-05-24 RX ORDER — DIAZEPAM 5 MG/ML
5 INJECTION, SOLUTION INTRAMUSCULAR; INTRAVENOUS ONCE
Status: COMPLETED | OUTPATIENT
Start: 2025-05-24 | End: 2025-05-24

## 2025-05-24 RX ADMIN — LIDOCAINE 4% 1 PATCH: 40 PATCH TOPICAL at 09:13

## 2025-05-24 RX ADMIN — IOHEXOL 77 ML: 350 INJECTION, SOLUTION INTRAVENOUS at 03:36

## 2025-05-24 RX ADMIN — KETOROLAC TROMETHAMINE 15 MG: 15 INJECTION, SOLUTION INTRAMUSCULAR; INTRAVENOUS at 09:13

## 2025-05-24 RX ADMIN — KETOROLAC TROMETHAMINE 15 MG: 15 INJECTION, SOLUTION INTRAMUSCULAR; INTRAVENOUS at 22:12

## 2025-05-24 RX ADMIN — METHYLPREDNISOLONE SODIUM SUCCINATE 125 MG: 125 INJECTION, POWDER, FOR SOLUTION INTRAMUSCULAR; INTRAVENOUS at 22:12

## 2025-05-24 RX ADMIN — SODIUM CHLORIDE 1000 ML: 0.9 INJECTION, SOLUTION INTRAVENOUS at 02:44

## 2025-05-24 RX ADMIN — DIAZEPAM 5 MG: 5 INJECTION INTRAMUSCULAR; INTRAVENOUS at 20:23

## 2025-05-24 RX ADMIN — ONDANSETRON 4 MG: 2 INJECTION, SOLUTION INTRAMUSCULAR; INTRAVENOUS at 20:24

## 2025-05-24 RX ADMIN — ONDANSETRON 4 MG: 2 INJECTION, SOLUTION INTRAMUSCULAR; INTRAVENOUS at 03:23

## 2025-05-24 RX ADMIN — ACETAMINOPHEN 975 MG: 325 TABLET, FILM COATED ORAL at 09:13

## 2025-05-24 RX ADMIN — KETOROLAC TROMETHAMINE 15 MG: 15 INJECTION, SOLUTION INTRAMUSCULAR; INTRAVENOUS at 02:45

## 2025-05-24 RX ADMIN — HYDROMORPHONE HYDROCHLORIDE 0.5 MG: 1 INJECTION, SOLUTION INTRAMUSCULAR; INTRAVENOUS; SUBCUTANEOUS at 23:00

## 2025-05-24 RX ADMIN — MORPHINE SULFATE 4 MG: 4 INJECTION INTRAVENOUS at 03:23

## 2025-05-24 ASSESSMENT — PAIN DESCRIPTION - DESCRIPTORS: DESCRIPTORS: SQUEEZING;STABBING;PRESSURE

## 2025-05-24 ASSESSMENT — COLUMBIA-SUICIDE SEVERITY RATING SCALE - C-SSRS
6. HAVE YOU EVER DONE ANYTHING, STARTED TO DO ANYTHING, OR PREPARED TO DO ANYTHING TO END YOUR LIFE?: NO
2. HAVE YOU ACTUALLY HAD ANY THOUGHTS OF KILLING YOURSELF?: NO
1. IN THE PAST MONTH, HAVE YOU WISHED YOU WERE DEAD OR WISHED YOU COULD GO TO SLEEP AND NOT WAKE UP?: NO

## 2025-05-24 ASSESSMENT — LIFESTYLE VARIABLES
EVER HAD A DRINK FIRST THING IN THE MORNING TO STEADY YOUR NERVES TO GET RID OF A HANGOVER: NO
EVER HAD A DRINK FIRST THING IN THE MORNING TO STEADY YOUR NERVES TO GET RID OF A HANGOVER: NO
EVER FELT BAD OR GUILTY ABOUT YOUR DRINKING: NO
HAVE YOU EVER FELT YOU SHOULD CUT DOWN ON YOUR DRINKING: NO
HAVE YOU EVER FELT YOU SHOULD CUT DOWN ON YOUR DRINKING: NO
TOTAL SCORE: 0
HAVE PEOPLE ANNOYED YOU BY CRITICIZING YOUR DRINKING: NO
EVER FELT BAD OR GUILTY ABOUT YOUR DRINKING: NO

## 2025-05-24 ASSESSMENT — PAIN DESCRIPTION - ONSET: ONSET: ONGOING

## 2025-05-24 ASSESSMENT — PAIN DESCRIPTION - ORIENTATION
ORIENTATION: RIGHT
ORIENTATION: RIGHT
ORIENTATION: RIGHT;MID

## 2025-05-24 ASSESSMENT — PAIN DESCRIPTION - LOCATION
LOCATION: BACK
LOCATION: OTHER (COMMENT)
LOCATION: BACK

## 2025-05-24 ASSESSMENT — PAIN SCALES - GENERAL
PAINLEVEL_OUTOF10: 8
PAINLEVEL_OUTOF10: 4
PAINLEVEL_OUTOF10: 3
PAINLEVEL_OUTOF10: 5 - MODERATE PAIN
PAINLEVEL_OUTOF10: 5 - MODERATE PAIN
PAINLEVEL_OUTOF10: 6
PAINLEVEL_OUTOF10: 8
PAINLEVEL_OUTOF10: 0 - NO PAIN

## 2025-05-24 ASSESSMENT — PAIN DESCRIPTION - PAIN TYPE
TYPE: ACUTE PAIN

## 2025-05-24 ASSESSMENT — PAIN DESCRIPTION - PROGRESSION
CLINICAL_PROGRESSION: NOT CHANGED
CLINICAL_PROGRESSION: NOT CHANGED

## 2025-05-24 ASSESSMENT — PAIN DESCRIPTION - FREQUENCY: FREQUENCY: CONSTANT/CONTINUOUS

## 2025-05-24 ASSESSMENT — PAIN - FUNCTIONAL ASSESSMENT
PAIN_FUNCTIONAL_ASSESSMENT: 0-10

## 2025-05-24 NOTE — PROGRESS NOTES
Emergency Department Transition of Care Note       Signout   I received Mini Johnson in signout from Dr. Mack.  Please see the ED Provider Note for all HPI, PE and MDM up to the time of signout at 0600.  This is in addition to the primary record.    In brief Mini Johnson is an 54 y.o. female presenting for R Flank/RUQ pain    At the time of signout we were awaiting:  Right upper quadrant ultrasound, the rest of the patient's workup has been unremarkable    ED Course & Medical Decision Making   Medical Decision Making:  Under my care, patient remained stable.  Right upper quadrant ultrasound demonstrates no evidence of cholecystitis, does have hepatic steatosis.  Because of this, I am referring the patient to GI.  She has been informed of the pulmonary nodules.  Given her workup, we will discharge her home at this time.  Patient agreeable to this plan of care.    ED Course:  ED Course as of 05/24/25 1031   Sat May 24, 2025   0239 Presents ED with complaint of pain in the right upper abdomen and flank area.  Went to see her doctor yesterday because there is also trace amount of blood in the urine.  They did a CT scan which did not show any stones.  Patient continued to have pain that was worse so she came in for evaluation treatment. [RZ]   0242 Plan is ultrasound right upper quadrant labs including urine I talked about pain control she drove herself here we will start with some Toradol she does not require any nausea medicine we will also give some IV fluids.  Considering cardiac etiology considering gallbladder etiology concerning pulmonary such as PE.  D-dimer was requested. [RZ]   0253 EKG done at 0 239 interpreted by me shows normal sinus rhythm at 100 bpm no STEMI similar to old EKG on May 1, 2019 [RZ]   0305 Patient was found to have an elevated D-dimer we will do a CT angio of her chest.  And then in the morning an ultrasound when the tech gets in. [RZ]   0448 Patient is improved after receiving  some pain medicine in emergency room.  She reports she still has some discomfort when she takes a deep breath.  CT scan showed number of nodules in the breast as well as in the lungs.  She is aware that she has some nodules in her lung.  I talked to her about the differential and about smoking cessation.  We are awaiting the ultrasound the patient we endorsed oncoming physician Dr. Brenner at 0 600 [RZ]   0848 Patient reassessed, still endorsing mild pain, states that morphine did not help. [SH]      ED Course User Index  [RZ] Rey Mack MD  [SH] Yunior Brenner MD         Diagnoses as of 05/24/25 1031   RUQ abdominal pain   Breast nodule   Lung nodules   Tobacco use   Hepatic steatosis       Disposition   As a result of the work-up, the patient was discharged home.  she was informed of her diagnosis and instructed to come back with any concerns or worsening of condition.  she and was agreeable to the plan as discussed above.  she was given the opportunity to ask questions.  All of the patient's questions were answered.    Procedures   Procedures        Yunior Brenner MD  Emergency Medicine

## 2025-05-24 NOTE — ED PROVIDER NOTES
HPI   Chief Complaint   Patient presents with    Back Pain     Pt states she has been having an increase in lowe back pain since yesterday. She had a urine dip and CT scan done, she was told she had blood in urine but her CT results were negative.        Mini is a 54-year-old female who has a history of hysterectomy presents with right sided abdominal pain.  Started Thursday morning and she went to see her doctor yesterday.  The pain initially was coming and going but significant worse when she took a deep breath.  She had outpatient CT scanning of her abdomen pelvis which showed a known lesion on the left kidney not related.  Patient had no significant finding on right side.  She denies any fever chills cough or cold.  She has not had any pain with urination.  Nothing seems to make it better although she did try some Tylenol.  Food does not seem to make it worse but she has not really been eating she has been only drinking a little bit of fluid.  She does note that when she takes deep breath it seems to hurt a lot more.  Seems to go up on that side as well.  No fever chills cough or cold.  Patient smokes less than a pack a day tobacco.  History is from patient and EMR and primary care physician note.              Patient History   Medical History[1]  Surgical History[2]  Family History[3]  Social History[4]    Physical Exam   ED Triage Vitals [05/24/25 0224]   Temperature Heart Rate Respirations BP   37.6 °C (99.7 °F) 99 18 (!) 147/92      Pulse Ox Temp Source Heart Rate Source Patient Position   99 % Temporal Monitor Lying      BP Location FiO2 (%)     Right arm --       Physical Exam  Vitals reviewed.   Constitutional:       General: She is awake.      Comments: Patient appears uncomfortable says her pain is about a 7 out of 10 in the right upper side   HENT:      Head: Normocephalic.      Nose: Nose normal.   Cardiovascular:      Rate and Rhythm: Normal rate and regular rhythm.   Pulmonary:      Effort:  Pulmonary effort is normal.      Breath sounds: Normal breath sounds.   Abdominal:      Palpations: Abdomen is soft.      Comments: Nondistended tender epigastric and right upper quadrant to palpation around the gallbladder area.    No right lower quadrant pain no true midline pain no left upper or lower quadrant pain.   Musculoskeletal:      Cervical back: Normal range of motion.   Skin:     General: Skin is warm.      Capillary Refill: Capillary refill takes less than 2 seconds.   Neurological:      Mental Status: She is alert.           ED Course & MDM   ED Course as of 05/24/25 1752   Sat May 24, 2025   0239 Presents ED with complaint of pain in the right upper abdomen and flank area.  Went to see her doctor yesterday because there is also trace amount of blood in the urine.  They did a CT scan which did not show any stones.  Patient continued to have pain that was worse so she came in for evaluation treatment. [RZ]   0242 Plan is ultrasound right upper quadrant labs including urine I talked about pain control she drove herself here we will start with some Toradol she does not require any nausea medicine we will also give some IV fluids.  Considering cardiac etiology considering gallbladder etiology concerning pulmonary such as PE.  D-dimer was requested. [RZ]   0253 EKG done at 0 239 interpreted by me shows normal sinus rhythm at 100 bpm no STEMI similar to old EKG on May 1, 2019 [RZ]   0305 Patient was found to have an elevated D-dimer we will do a CT angio of her chest.  And then in the morning an ultrasound when the tech gets in. [RZ]   0448 Patient is improved after receiving some pain medicine in emergency room.  She reports she still has some discomfort when she takes a deep breath.  CT scan showed number of nodules in the breast as well as in the lungs.  She is aware that she has some nodules in her lung.  I talked to her about the differential and about smoking cessation.  We are awaiting the ultrasound  the patient we endorsed oncoming physician Dr. Brenner at 0 600 [RZ]   0848 Patient reassessed, still endorsing mild pain, states that morphine did not help. [SH]      ED Course User Index  [RZ] Rey Mack MD  [SH] Yunior Brenner MD         Diagnoses as of 05/24/25 1752   RUQ abdominal pain   Breast nodule   Lung nodules   Tobacco use   Hepatic steatosis                 No data recorded     Jacqueline Coma Scale Score: 15 (05/24/25 0236 : Dwayne Bautista RN)                           Medical Decision Making      Procedure  Procedures       Rey Mack MD  05/24/25 0129       [1]   Past Medical History:  Diagnosis Date    Acute candidiasis of vulva and vagina 08/09/2018    Candidiasis, vagina    Chronic ethmoidal sinusitis 06/28/2017    Ethmoid sinusitis    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, initial encounter (Multi) 07/11/2016    Contusion of brain    Headache, unspecified 08/09/2017    Acute intractable headache, unspecified headache type    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    Other specified soft tissue disorders     Limb swelling    Pain in unspecified limb 10/04/2016    Limb pain    Paresthesia of skin 10/06/2016    Tingling    Personal history of nicotine dependence 07/11/2016    Former smoker    Personal history of other (healed) physical injury and trauma     History of sprain of knee    Personal history of other diseases of the digestive system 01/31/2017    History of diverticulitis of colon    Personal history of other diseases of the musculoskeletal system and connective tissue 05/27/2014    History of muscle spasm    Personal history of other diseases of the musculoskeletal system and connective tissue 08/15/2016    History of neck pain    Personal history of other diseases of the musculoskeletal system and connective tissue 01/08/2017    History of low back pain    Personal history of  other infectious and parasitic diseases 12/04/2017    History of herpes labialis    Personal history of other medical treatment 06/24/2016    History of screening mammography    Personal history of other medical treatment     History of screening mammography    Personal history of other specified conditions 01/14/2017    History of diarrhea    Personal history of other specified conditions 10/14/2016    History of numbness    Personal history of other specified conditions 05/27/2014    History of dysuria    Personal history of other specified conditions 06/25/2018    History of bruising easily    Personal history of other specified conditions 06/25/2018    History of wheezing    Personal history of other specified conditions 01/24/2015    History of fatigue    Personal history of other specified conditions 08/08/2018    History of headache    Personal history of other specified conditions     History of fatigue    Personal history of pneumonia (recurrent) 08/08/2018    History of pneumonia    Personal history of traumatic brain injury 10/01/2017    History of concussion    Personal history of traumatic brain injury 12/18/2018    History of closed head injury    Post-surgical hypothyroidism 10/30/2013    S/P hysterectomy 10/02/2014    Vaginal lesion 11/28/2011   [2]   Past Surgical History:  Procedure Laterality Date    COLONOSCOPY  09/16/2013    Complete Colonoscopy    DILATION AND CURETTAGE OF UTERUS  09/16/2013    Dilation And Curettage    HYSTERECTOMY  09/16/2013    Hysterectomy    MR HEAD ANGIO WO IV CONTRAST  8/18/2017    MR HEAD ANGIO WO IV CONTRAST 8/18/2017 [facility]    OTHER SURGICAL HISTORY  09/16/2013    Stress Test ECG Performed    OTHER SURGICAL HISTORY  09/16/2013    Biopsy Endometrial, Without Cervical Dilation    TOTAL THYROIDECTOMY  09/16/2013    Thyroid Surgery Total Thyroidectomy   [3]   Family History  Problem Relation Name Age of Onset    Hyperlipidemia Mother      Cancer Mother       Heart disease Father      Hyperlipidemia Father      Thyroid disease Sister      Other (non-hodgkin) Sister      Heart disease Father's Brother      Heart disease Paternal Grandfather      Coronary artery disease Other      Hypothyroidism Other      Other (non hodgkins lymphoma) Other     [4]   Social History  Tobacco Use    Smoking status: Every Day     Current packs/day: 0.50     Average packs/day: 0.5 packs/day for 4.0 years (2.0 ttl pk-yrs)     Types: Cigarettes     Passive exposure: Past    Smokeless tobacco: Never   Substance Use Topics    Alcohol use: Yes     Alcohol/week: 5.0 standard drinks of alcohol     Types: 5 Glasses of wine per week    Drug use: Never        Rey Mack MD  05/24/25 7245

## 2025-05-24 NOTE — DISCHARGE INSTRUCTIONS
Please return to the emergency department with any worsening of your pain.  Follow-up with your primary care doctor for further testing and treatment.    For pain control, you can take ibuprofen 400 mg up to every 6 hours, and Tylenol 1000 mg up to every 6 hours as well.  You can alternate each every 3 hours for best results.

## 2025-05-25 ENCOUNTER — TELEPHONE (OUTPATIENT)
Dept: INTERNAL MEDICINE | Facility: HOSPITAL | Age: 55
End: 2025-05-25

## 2025-05-25 ENCOUNTER — APPOINTMENT (OUTPATIENT)
Dept: RADIOLOGY | Facility: HOSPITAL | Age: 55
End: 2025-05-25
Payer: COMMERCIAL

## 2025-05-25 VITALS
RESPIRATION RATE: 16 BRPM | HEART RATE: 63 BPM | SYSTOLIC BLOOD PRESSURE: 123 MMHG | HEIGHT: 65 IN | OXYGEN SATURATION: 96 % | WEIGHT: 135.7 LBS | DIASTOLIC BLOOD PRESSURE: 75 MMHG | TEMPERATURE: 97 F | BODY MASS INDEX: 22.61 KG/M2

## 2025-05-25 PROBLEM — M54.50 ACUTE RIGHT-SIDED LOW BACK PAIN WITHOUT SCIATICA: Status: RESOLVED | Noted: 2025-05-25 | Resolved: 2025-05-25

## 2025-05-25 PROBLEM — M54.50 ACUTE RIGHT-SIDED LOW BACK PAIN WITHOUT SCIATICA: Status: ACTIVE | Noted: 2025-05-25

## 2025-05-25 PROBLEM — R52 INTRACTABLE PAIN: Status: ACTIVE | Noted: 2025-05-25

## 2025-05-25 LAB
ERYTHROCYTE [DISTWIDTH] IN BLOOD BY AUTOMATED COUNT: 11.9 % (ref 11.5–14.5)
HCT VFR BLD AUTO: 35 % (ref 36–46)
HGB BLD-MCNC: 12 G/DL (ref 12–16)
MCH RBC QN AUTO: 33.7 PG (ref 26–34)
MCHC RBC AUTO-ENTMCNC: 34.3 G/DL (ref 32–36)
MCV RBC AUTO: 98 FL (ref 80–100)
NRBC BLD-RTO: 0 /100 WBCS (ref 0–0)
PLATELET # BLD AUTO: 226 X10*3/UL (ref 150–450)
RBC # BLD AUTO: 3.56 X10*6/UL (ref 4–5.2)
WBC # BLD AUTO: 8.3 X10*3/UL (ref 4.4–11.3)

## 2025-05-25 PROCEDURE — 96366 THER/PROPH/DIAG IV INF ADDON: CPT

## 2025-05-25 PROCEDURE — 99223 1ST HOSP IP/OBS HIGH 75: CPT | Performed by: NURSE PRACTITIONER

## 2025-05-25 PROCEDURE — 72110 X-RAY EXAM L-2 SPINE 4/>VWS: CPT | Performed by: RADIOLOGY

## 2025-05-25 PROCEDURE — 2500000001 HC RX 250 WO HCPCS SELF ADMINISTERED DRUGS (ALT 637 FOR MEDICARE OP): Performed by: NURSE PRACTITIONER

## 2025-05-25 PROCEDURE — 85027 COMPLETE CBC AUTOMATED: CPT | Mod: 59 | Performed by: NURSE PRACTITIONER

## 2025-05-25 PROCEDURE — 96367 TX/PROPH/DG ADDL SEQ IV INF: CPT

## 2025-05-25 PROCEDURE — 96365 THER/PROPH/DIAG IV INF INIT: CPT

## 2025-05-25 PROCEDURE — 72110 X-RAY EXAM L-2 SPINE 4/>VWS: CPT

## 2025-05-25 PROCEDURE — G0378 HOSPITAL OBSERVATION PER HR: HCPCS

## 2025-05-25 PROCEDURE — 96376 TX/PRO/DX INJ SAME DRUG ADON: CPT

## 2025-05-25 PROCEDURE — 84145 PROCALCITONIN (PCT): CPT | Mod: GEALAB | Performed by: NURSE PRACTITIONER

## 2025-05-25 PROCEDURE — 36415 COLL VENOUS BLD VENIPUNCTURE: CPT | Performed by: NURSE PRACTITIONER

## 2025-05-25 PROCEDURE — 99239 HOSP IP/OBS DSCHRG MGMT >30: CPT | Performed by: FAMILY MEDICINE

## 2025-05-25 PROCEDURE — 2500000004 HC RX 250 GENERAL PHARMACY W/ HCPCS (ALT 636 FOR OP/ED): Mod: JZ | Performed by: NURSE PRACTITIONER

## 2025-05-25 RX ORDER — THYROID 30 MG/1
120 TABLET ORAL
Status: DISCONTINUED | OUTPATIENT
Start: 2025-05-25 | End: 2025-05-25 | Stop reason: HOSPADM

## 2025-05-25 RX ORDER — DOCUSATE SODIUM 50 MG/5ML
100 LIQUID ORAL 2 TIMES DAILY
Status: DISCONTINUED | OUTPATIENT
Start: 2025-05-25 | End: 2025-05-25 | Stop reason: HOSPADM

## 2025-05-25 RX ORDER — ATORVASTATIN CALCIUM 10 MG/1
10 TABLET, FILM COATED ORAL DAILY
Status: DISCONTINUED | OUTPATIENT
Start: 2025-05-25 | End: 2025-05-25 | Stop reason: HOSPADM

## 2025-05-25 RX ORDER — CEFTRIAXONE 2 G/50ML
2 INJECTION, SOLUTION INTRAVENOUS EVERY 24 HOURS
Status: DISCONTINUED | OUTPATIENT
Start: 2025-05-25 | End: 2025-05-25 | Stop reason: HOSPADM

## 2025-05-25 RX ORDER — THYROID 30 MG/1
60 TABLET ORAL
Status: DISCONTINUED | OUTPATIENT
Start: 2025-05-25 | End: 2025-05-25 | Stop reason: HOSPADM

## 2025-05-25 RX ORDER — CYCLOBENZAPRINE HCL 5 MG
5 TABLET ORAL 3 TIMES DAILY PRN
Qty: 15 TABLET | Refills: 0 | Status: SHIPPED | OUTPATIENT
Start: 2025-05-25 | End: 2025-05-30

## 2025-05-25 RX ORDER — KETOROLAC TROMETHAMINE 30 MG/ML
30 INJECTION, SOLUTION INTRAMUSCULAR; INTRAVENOUS EVERY 6 HOURS SCHEDULED
Status: DISCONTINUED | OUTPATIENT
Start: 2025-05-25 | End: 2025-05-25 | Stop reason: HOSPADM

## 2025-05-25 RX ORDER — FENOFIBRATE 54 MG/1
54 TABLET ORAL DAILY
Status: DISCONTINUED | OUTPATIENT
Start: 2025-05-25 | End: 2025-05-25 | Stop reason: HOSPADM

## 2025-05-25 RX ORDER — LIDOCAINE 560 MG/1
2 PATCH PERCUTANEOUS; TOPICAL; TRANSDERMAL DAILY
Status: DISCONTINUED | OUTPATIENT
Start: 2025-05-25 | End: 2025-05-25 | Stop reason: HOSPADM

## 2025-05-25 RX ORDER — ACETAMINOPHEN 325 MG/1
975 TABLET ORAL EVERY 8 HOURS
Status: DISCONTINUED | OUTPATIENT
Start: 2025-05-25 | End: 2025-05-25 | Stop reason: HOSPADM

## 2025-05-25 RX ORDER — CYCLOBENZAPRINE HCL 5 MG
5 TABLET ORAL 3 TIMES DAILY PRN
Status: DISCONTINUED | OUTPATIENT
Start: 2025-05-25 | End: 2025-05-25 | Stop reason: HOSPADM

## 2025-05-25 RX ORDER — TRAMADOL HYDROCHLORIDE 50 MG/1
50 TABLET, FILM COATED ORAL 2 TIMES DAILY PRN
Qty: 6 TABLET | Refills: 0 | Status: SHIPPED | OUTPATIENT
Start: 2025-05-25 | End: 2025-05-28

## 2025-05-25 RX ADMIN — CYCLOBENZAPRINE HYDROCHLORIDE 5 MG: 5 TABLET, FILM COATED ORAL at 09:49

## 2025-05-25 RX ADMIN — ACETAMINOPHEN 975 MG: 325 TABLET, FILM COATED ORAL at 02:12

## 2025-05-25 RX ADMIN — CEFTRIAXONE 2 G: 2 INJECTION, SOLUTION INTRAVENOUS at 06:16

## 2025-05-25 RX ADMIN — ACETAMINOPHEN 975 MG: 325 TABLET, FILM COATED ORAL at 09:49

## 2025-05-25 RX ADMIN — DOCUSATE SODIUM LIQUID 100 MG: 100 LIQUID ORAL at 09:49

## 2025-05-25 RX ADMIN — KETOROLAC TROMETHAMINE 30 MG: 30 INJECTION, SOLUTION INTRAMUSCULAR at 11:40

## 2025-05-25 RX ADMIN — KETOROLAC TROMETHAMINE 30 MG: 30 INJECTION, SOLUTION INTRAMUSCULAR at 04:24

## 2025-05-25 RX ADMIN — AZITHROMYCIN MONOHYDRATE 500 MG: 500 INJECTION, POWDER, LYOPHILIZED, FOR SOLUTION INTRAVENOUS at 07:44

## 2025-05-25 RX ADMIN — THYROID 120 MG: 30 TABLET ORAL at 06:28

## 2025-05-25 RX ADMIN — DOCUSATE SODIUM LIQUID 100 MG: 100 LIQUID ORAL at 02:12

## 2025-05-25 SDOH — SOCIAL STABILITY: SOCIAL INSECURITY
WITHIN THE LAST YEAR, HAVE YOU BEEN RAPED OR FORCED TO HAVE ANY KIND OF SEXUAL ACTIVITY BY YOUR PARTNER OR EX-PARTNER?: NO

## 2025-05-25 SDOH — SOCIAL STABILITY: SOCIAL INSECURITY
WITHIN THE LAST YEAR, HAVE YOU BEEN KICKED, HIT, SLAPPED, OR OTHERWISE PHYSICALLY HURT BY YOUR PARTNER OR EX-PARTNER?: NO

## 2025-05-25 SDOH — SOCIAL STABILITY: SOCIAL INSECURITY: WITHIN THE LAST YEAR, HAVE YOU BEEN AFRAID OF YOUR PARTNER OR EX-PARTNER?: NO

## 2025-05-25 SDOH — SOCIAL STABILITY: SOCIAL INSECURITY: DO YOU FEEL ANYONE HAS EXPLOITED OR TAKEN ADVANTAGE OF YOU FINANCIALLY OR OF YOUR PERSONAL PROPERTY?: NO

## 2025-05-25 SDOH — ECONOMIC STABILITY: FOOD INSECURITY: WITHIN THE PAST 12 MONTHS, THE FOOD YOU BOUGHT JUST DIDN'T LAST AND YOU DIDN'T HAVE MONEY TO GET MORE.: NEVER TRUE

## 2025-05-25 SDOH — ECONOMIC STABILITY: INCOME INSECURITY: IN THE PAST 12 MONTHS HAS THE ELECTRIC, GAS, OIL, OR WATER COMPANY THREATENED TO SHUT OFF SERVICES IN YOUR HOME?: NO

## 2025-05-25 SDOH — ECONOMIC STABILITY: HOUSING INSECURITY: AT ANY TIME IN THE PAST 12 MONTHS, WERE YOU HOMELESS OR LIVING IN A SHELTER (INCLUDING NOW)?: NO

## 2025-05-25 SDOH — ECONOMIC STABILITY: FOOD INSECURITY
WITHIN THE PAST 12 MONTHS, YOU WORRIED THAT YOUR FOOD WOULD RUN OUT BEFORE YOU GOT THE MONEY TO BUY MORE.: SOMETIMES TRUE

## 2025-05-25 SDOH — ECONOMIC STABILITY: HOUSING INSECURITY: IN THE LAST 12 MONTHS, WAS THERE A TIME WHEN YOU WERE NOT ABLE TO PAY THE MORTGAGE OR RENT ON TIME?: NO

## 2025-05-25 SDOH — SOCIAL STABILITY: SOCIAL INSECURITY: DO YOU FEEL UNSAFE GOING BACK TO THE PLACE WHERE YOU ARE LIVING?: NO

## 2025-05-25 SDOH — SOCIAL STABILITY: SOCIAL INSECURITY: DOES ANYONE TRY TO KEEP YOU FROM HAVING/CONTACTING OTHER FRIENDS OR DOING THINGS OUTSIDE YOUR HOME?: NO

## 2025-05-25 SDOH — SOCIAL STABILITY: SOCIAL INSECURITY: WITHIN THE LAST YEAR, HAVE YOU BEEN HUMILIATED OR EMOTIONALLY ABUSED IN OTHER WAYS BY YOUR PARTNER OR EX-PARTNER?: NO

## 2025-05-25 SDOH — ECONOMIC STABILITY: HOUSING INSECURITY: IN THE PAST 12 MONTHS, HOW MANY TIMES HAVE YOU MOVED WHERE YOU WERE LIVING?: 0

## 2025-05-25 SDOH — SOCIAL STABILITY: SOCIAL INSECURITY: ARE YOU OR HAVE YOU BEEN THREATENED OR ABUSED PHYSICALLY, EMOTIONALLY, OR SEXUALLY BY ANYONE?: NO

## 2025-05-25 SDOH — SOCIAL STABILITY: SOCIAL INSECURITY: HAVE YOU HAD THOUGHTS OF HARMING ANYONE ELSE?: NO

## 2025-05-25 SDOH — ECONOMIC STABILITY: FOOD INSECURITY: HOW HARD IS IT FOR YOU TO PAY FOR THE VERY BASICS LIKE FOOD, HOUSING, MEDICAL CARE, AND HEATING?: NOT VERY HARD

## 2025-05-25 SDOH — SOCIAL STABILITY: SOCIAL INSECURITY: WERE YOU ABLE TO COMPLETE ALL THE BEHAVIORAL HEALTH SCREENINGS?: YES

## 2025-05-25 SDOH — SOCIAL STABILITY: SOCIAL INSECURITY: HAS ANYONE EVER THREATENED TO HURT YOUR FAMILY OR YOUR PETS?: NO

## 2025-05-25 SDOH — SOCIAL STABILITY: SOCIAL INSECURITY: HAVE YOU HAD ANY THOUGHTS OF HARMING ANYONE ELSE?: NO

## 2025-05-25 SDOH — SOCIAL STABILITY: SOCIAL INSECURITY: ABUSE: ADULT

## 2025-05-25 SDOH — ECONOMIC STABILITY: TRANSPORTATION INSECURITY: IN THE PAST 12 MONTHS, HAS LACK OF TRANSPORTATION KEPT YOU FROM MEDICAL APPOINTMENTS OR FROM GETTING MEDICATIONS?: NO

## 2025-05-25 SDOH — SOCIAL STABILITY: SOCIAL INSECURITY: ARE THERE ANY APPARENT SIGNS OF INJURIES/BEHAVIORS THAT COULD BE RELATED TO ABUSE/NEGLECT?: NO

## 2025-05-25 ASSESSMENT — COGNITIVE AND FUNCTIONAL STATUS - GENERAL
PATIENT BASELINE BEDBOUND: NO
MOBILITY SCORE: 24
DAILY ACTIVITIY SCORE: 24
DAILY ACTIVITIY SCORE: 24
MOBILITY SCORE: 24
DAILY ACTIVITIY SCORE: 24
MOBILITY SCORE: 24

## 2025-05-25 ASSESSMENT — ENCOUNTER SYMPTOMS
FEVER: 0
BACK PAIN: 1
VOMITING: 0
SHORTNESS OF BREATH: 0
COUGH: 0
NAUSEA: 0
HEMATURIA: 0
SORE THROAT: 0
DYSURIA: 0
CHILLS: 0
ARTHRALGIAS: 0
AGITATION: 0
NUMBNESS: 0
DIZZINESS: 0
PALPITATIONS: 0
WOUND: 0
DIFFICULTY URINATING: 0
CONFUSION: 0
FACIAL ASYMMETRY: 0
FREQUENCY: 0
MYALGIAS: 0

## 2025-05-25 ASSESSMENT — PATIENT HEALTH QUESTIONNAIRE - PHQ9
1. LITTLE INTEREST OR PLEASURE IN DOING THINGS: NOT AT ALL
SUM OF ALL RESPONSES TO PHQ9 QUESTIONS 1 & 2: 0
2. FEELING DOWN, DEPRESSED OR HOPELESS: NOT AT ALL

## 2025-05-25 ASSESSMENT — ACTIVITIES OF DAILY LIVING (ADL)
WALKS IN HOME: INDEPENDENT
GROOMING: INDEPENDENT
ASSISTIVE_DEVICE: EYEGLASSES;CONTACTS
BATHING: INDEPENDENT
DRESSING YOURSELF: INDEPENDENT
ADEQUATE_TO_COMPLETE_ADL: YES
FEEDING YOURSELF: INDEPENDENT
JUDGMENT_ADEQUATE_SAFELY_COMPLETE_DAILY_ACTIVITIES: YES
TOILETING: INDEPENDENT
HEARING - LEFT EAR: FUNCTIONAL
PATIENT'S MEMORY ADEQUATE TO SAFELY COMPLETE DAILY ACTIVITIES?: YES
LACK_OF_TRANSPORTATION: NO
HEARING - RIGHT EAR: FUNCTIONAL

## 2025-05-25 ASSESSMENT — PAIN - FUNCTIONAL ASSESSMENT
PAIN_FUNCTIONAL_ASSESSMENT: 0-10
PAIN_FUNCTIONAL_ASSESSMENT: 0-10

## 2025-05-25 ASSESSMENT — LIFESTYLE VARIABLES
HOW MANY STANDARD DRINKS CONTAINING ALCOHOL DO YOU HAVE ON A TYPICAL DAY: 1 OR 2
AUDIT-C TOTAL SCORE: 4
AUDIT-C TOTAL SCORE: 4
SKIP TO QUESTIONS 9-10: 1
HOW OFTEN DO YOU HAVE A DRINK CONTAINING ALCOHOL: 4 OR MORE TIMES A WEEK
HOW OFTEN DO YOU HAVE 6 OR MORE DRINKS ON ONE OCCASION: NEVER

## 2025-05-25 ASSESSMENT — PAIN SCALES - GENERAL
PAINLEVEL_OUTOF10: 2
PAINLEVEL_OUTOF10: 3
PAINLEVEL_OUTOF10: 2

## 2025-05-25 NOTE — PROGRESS NOTES
05/25/25 0959   Discharge Planning   Living Arrangements Spouse/significant other;Children   Support Systems Spouse/significant other;Children   Assistance Needed patient is A&Ox3, independent in ADL's, uses no AD, no DME, drives  PCP Dr. Jacome   Type of Residence Private residence   Number of Stairs Within Residence 13  (to basement)   Do you have animals or pets at home? Yes   Type of Animals or Pets cats/dog   Who is requesting discharge planning? Provider   Home or Post Acute Services None   Expected Discharge Disposition Home

## 2025-05-25 NOTE — DISCHARGE SUMMARY
Discharge Diagnosis  Acute right-sided low back pain without sciatica         Issues Requiring Follow-Up  PCP follow up for right low back pain, possible kidney stone passed otherwise unclear etiology    Discharge Meds     Medication List      START taking these medications     cyclobenzaprine 5 mg tablet; Commonly known as: Flexeril; Take 1 tablet   (5 mg) by mouth 3 times a day as needed for muscle spasms for up to 5   days.   traMADol 50 mg tablet; Commonly known as: Ultram; Take 1 tablet (50 mg)   by mouth 2 times a day as needed for moderate pain (4 - 6) for up to 3   days.     CHANGE how you take these medications     [place] Thyroid 60 mg tablet; Generic drug: thyroid (pork); PLEASE SEE   ATTACHED FOR DETAILED DIRECTIONS; What changed: See the new instructions.     CONTINUE taking these medications     atorvastatin 10 mg tablet; Commonly known as: Lipitor; TAKE 1 TABLET BY   MOUTH EVERY DAY   fenofibrate 48 mg tablet; Commonly known as: Tricor; TAKE 1 TABLET (48   MG) BY MOUTH ONCE DAILY.   tamsulosin 0.4 mg 24 hr capsule; Commonly known as: Flomax; Take 1   capsule (0.4 mg) by mouth once daily. Do not crush, chew, or split.   valACYclovir 500 mg tablet; Commonly known as: Valtrex; TAKE 1 TABLET BY   MOUTH EVERY DAY     STOP taking these medications     zolpidem CR 12.5 mg ER tablet; Commonly known as: Ambien CR       Test Results Pending At Discharge  Pending Labs       Order Current Status    Extra Tubes In process    PST Top In process    Procalcitonin In process            Hospital Course  53yo CF with PMH of HLD, hypothyroidism, and known angiomyolipoma that presented to the ED with right flank/low back pain and was admitted for same reason. Patient had resolution of symptoms after addition of flexeril to regimen and was able to ambulate without difficulty. She feels she might have passed a kidney stone but otherwise denies any radiation of symptoms or other complaints. Will discharge patient with PRN  tramadol and flexeril for symptom control and instructed to follow up with PCP.    Medically cleared for discharge. Medications reviewed and reconciled. Scripts prepared as needed.  Discussed with the family, , and . Questions answered. Understanding verbalized. Overall time spent on discharge was longer than 35 min.      Pertinent Physical Exam At Time of Discharge  Constitutional: alert and oriented x 3, awake, cooperative, no acute distress  Skin: warm and dry  Head/Neck: Normocephalic, atraumatic  Eyes: clear sclera  ENMT: mucous membranes moist  Cardio: Regular rate and rhythm  Resp: CTA bilaterally, good respiratory effort  Gastrointestinal: Soft, nontender, nondistended  Musculoskeletal: ROM intact, no joint swelling  Extremities: No edema, cyanosis, or clubbing  Neuro: alert and oriented x 3  Psychological: Appropriate mood and behavior      Outpatient Follow-Up  Future Appointments   Date Time Provider Department Center   10/3/2025  8:00 AM Deborah Jacome MD [id number]   5/15/2026  8:15 AM Sadaf Juárez MD [id number]         Marilee Eden DO

## 2025-05-25 NOTE — ED PROVIDER NOTES
The patient was seen by the midlevel/resident.  I have personally saw the patient and made/approved the management plan and take responsibility for the patient management.  I reviewed the EKG's (when done) and agree with the interpretation.  I have seen and examined the patient; agree with the workup, evaluation, MDM, and diagnosis.  The care plan has been discussed with the midlevel/resident; I have reviewed the note and agree with the documented findings.     Patient presents with right-sided back flank pain that started earlier she was worked up in the emergency room last night and previously had CT of the abdomen pelvis of the chest to rule out PE ultrasound of the gallbladder and give her medicines.  She went home was doing well up until 6 PM today when she started having pain again it was severe.  She it was a spasm type pain.  She was given medication to emergency room which helped a little bit but she still has discomfort.  Unsure the cause of her symptoms.  Anticipate possible hospitalization.  Diagnoses as of 05/25/25 0534   Right-sided low back pain without sciatica, unspecified chronicity   Failure of outpatient treatment     MD Rey Medeiros MD  05/25/25 0509

## 2025-05-25 NOTE — H&P
History Of Present Illness  Mini Johnsno is a 54 y.o. female presenting with with no significant medical history who presented to [facility] for the second time in 2 nights due to intractable right lower back pain that she describes as a severe gripping almost like a spasm.  Pain medication brought the patient's level down to a 2 out of 10 and she is resting comfortably currently.  Patient denies any activity that would elicit the pain.  She denied urinary symptoms, fever, chills, hematuria, nausea, vomiting.  No further complaint in the review of systems.  Pertinent workup in the ER personally reviewed and interpreted included: CT of the chest showed no evidence for PE, +soft tissue nodule of the right breast.  There was development of multiple new focal opacities that may be infectious in etiology. No axillary or mediastinal lymphadenopathy. Patient also had a right upper quadrant ultrasound that showed hepatomegaly and diffuse steatosis but no abnormality related to the gallbladder. CT abdomen pelvis completed 2 nights ago that showed nodular densities in the lower lobes.  A likely angiomyolipoma and no acute process in the abdomen or pelvis including no renal calculi or hydronephrosis.  Patient was aware of both above abnormal findings and is following up outpatient with appropriate nephrology and pulmonology.    No fevers, no leukocytosis, no left shift, BMP unremarkable, H&H 11.5 and 33, UA bland, troponin negative, EKG reviewed and unremarkable.    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, Other specified soft tissue disorders, Pain in unspecified limb (10/04/2016), Paresthesia of skin  (10/06/2016), Personal history of nicotine dependence (07/11/2016), Personal history of other (healed) physical injury and trauma, Personal history of other diseases of the digestive system (01/31/2017), Personal history of other diseases of the musculoskeletal system and connective tissue (05/27/2014), Personal history of other diseases of the musculoskeletal system and connective tissue (08/15/2016), Personal history of other diseases of the musculoskeletal system and connective tissue (01/08/2017), Personal history of other infectious and parasitic diseases (12/04/2017), Personal history of other medical treatment (06/24/2016), Personal history of other medical treatment, Personal history of other specified conditions (01/14/2017), Personal history of other specified conditions (10/14/2016), Personal history of other specified conditions (05/27/2014), Personal history of other specified conditions (06/25/2018), Personal history of other specified conditions (06/25/2018), Personal history of other specified conditions (01/24/2015), Personal history of other specified conditions (08/08/2018), Personal history of other specified conditions, Personal history of pneumonia (recurrent) (08/08/2018), Personal history of traumatic brain injury (10/01/2017), Personal history of traumatic brain injury (12/18/2018), Post-surgical hypothyroidism (10/30/2013), S/P hysterectomy (10/02/2014), and Vaginal lesion (11/28/2011).    Surgical History  She has a past surgical history that includes Colonoscopy (09/16/2013); Dilation and curettage of uterus (09/16/2013); Total thyroidectomy (09/16/2013); Hysterectomy (09/16/2013); Other surgical history (09/16/2013); Other surgical history (09/16/2013); and MR angio head wo IV contrast (8/18/2017).     Social History  She reports that she has been smoking cigarettes. She has a 2 pack-year smoking history. She has been exposed to tobacco smoke. She has never used smokeless tobacco. She  reports current alcohol use of about 5.0 standard drinks of alcohol per week. She reports that she does not use drugs.    Family History  Family History[1]     Allergies  Penicillins, Verapamil, and Bupropion    Review of Systems   Constitutional:  Negative for chills and fever.   HENT:  Negative for sneezing and sore throat.    Respiratory:  Negative for cough and shortness of breath.    Cardiovascular:  Negative for chest pain, palpitations and leg swelling.   Gastrointestinal:  Negative for nausea and vomiting.   Genitourinary:  Negative for difficulty urinating, dysuria, frequency, hematuria and urgency.   Musculoskeletal:  Positive for back pain. Negative for arthralgias and myalgias.        Non radiating   Skin:  Negative for rash and wound.   Neurological:  Negative for dizziness, facial asymmetry and numbness.   Psychiatric/Behavioral:  Negative for agitation, behavioral problems and confusion.      Physical Exam  Vitals reviewed.   Constitutional:       General: She is not in acute distress.     Appearance: Normal appearance. She is not ill-appearing, toxic-appearing or diaphoretic.   HENT:      Head: Normocephalic and atraumatic.      Mouth/Throat:      Mouth: Mucous membranes are moist.      Pharynx: Oropharynx is clear.   Eyes:      Extraocular Movements: Extraocular movements intact.      Conjunctiva/sclera: Conjunctivae normal.   Cardiovascular:      Rate and Rhythm: Normal rate and regular rhythm.      Pulses: Normal pulses.      Heart sounds: No murmur heard.  Pulmonary:      Effort: Pulmonary effort is normal.      Breath sounds: Normal breath sounds.   Abdominal:      General: Bowel sounds are normal. There is no distension.      Palpations: Abdomen is soft.      Tenderness: There is no abdominal tenderness. There is no guarding or rebound.   Musculoskeletal:         General: No swelling. Normal range of motion.      Right lower leg: No edema.      Left lower leg: No edema.      Comments: Neg  straight leg test, no paraspinal tenderness on exam   Skin:     General: Skin is warm and dry.      Comments: Skin intact   Neurological:      General: No focal deficit present.      Mental Status: She is alert and oriented to person, place, and time. Mental status is at baseline.      Motor: No weakness.   Psychiatric:         Mood and Affect: Mood normal.         Behavior: Behavior normal.     Last Recorded Vitals  /68   Pulse 98   Temp 37.4 °C (99.3 °F) (Temporal)   Resp 17   Wt 61.2 kg (135 lb)   SpO2 (!) 91%     Relevant Results  Scheduled medications  Scheduled Medications[2]  Continuous medications  Continuous Medications[3]  PRN medications  PRN Medications[4]    Results for orders placed or performed during the hospital encounter of 05/24/25 (from the past 24 hours)   CBC and Auto Differential   Result Value Ref Range    WBC 9.8 4.4 - 11.3 x10*3/uL    nRBC 0.0 0.0 - 0.0 /100 WBCs    RBC 3.38 (L) 4.00 - 5.20 x10*6/uL    Hemoglobin 11.5 (L) 12.0 - 16.0 g/dL    Hematocrit 32.9 (L) 36.0 - 46.0 %    MCV 97 80 - 100 fL    MCH 34.0 26.0 - 34.0 pg    MCHC 35.0 32.0 - 36.0 g/dL    RDW 12.1 11.5 - 14.5 %    Platelets 206 150 - 450 x10*3/uL    Neutrophils % 72.3 40.0 - 80.0 %    Immature Granulocytes %, Automated 0.3 0.0 - 0.9 %    Lymphocytes % 13.8 13.0 - 44.0 %    Monocytes % 12.8 2.0 - 10.0 %    Eosinophils % 0.7 0.0 - 6.0 %    Basophils % 0.1 0.0 - 2.0 %    Neutrophils Absolute 7.09 1.20 - 7.70 x10*3/uL    Immature Granulocytes Absolute, Automated 0.03 0.00 - 0.70 x10*3/uL    Lymphocytes Absolute 1.35 1.20 - 4.80 x10*3/uL    Monocytes Absolute 1.25 (H) 0.10 - 1.00 x10*3/uL    Eosinophils Absolute 0.07 0.00 - 0.70 x10*3/uL    Basophils Absolute 0.01 0.00 - 0.10 x10*3/uL   Basic metabolic panel   Result Value Ref Range    Glucose 102 (H) 74 - 99 mg/dL    Sodium 133 (L) 136 - 145 mmol/L    Potassium 4.0 3.5 - 5.3 mmol/L    Chloride 99 98 - 107 mmol/L    Bicarbonate 22 21 - 32 mmol/L    Anion Gap 16 10 -  20 mmol/L    Urea Nitrogen 16 6 - 23 mg/dL    Creatinine 0.97 0.50 - 1.05 mg/dL    eGFR 69 >60 mL/min/1.73m*2    Calcium 8.5 (L) 8.6 - 10.3 mg/dL     US right upper quadrant  Result Date: 5/24/2025  Interpreted By:  Anibal Nava, STUDY: US RIGHT UPPER QUADRANT  5/24/2025 8:37 am   INDICATION: 53 y/o   F with  Signs/Symptoms:pain in RUQ eval for gallbladder.   COMPARISON: 05/23/2024 CT abdomen and pelvis   ACCESSION NUMBER(S): [id number]   ORDERING CLINICIAN: ASHLEY OCAMPO   TECHNIQUE: Routine ultrasound of the right upper quadrant was performed.  Static images were obtained for remote interpretation.   FINDINGS: LIVER: Craniocaudal length:  17.5 cm,  enlarged Echogenicity:  Increased Mass:  None.   BILE DUCTS: Intrahepatic ducts: Non-dilated. Common bile duct diameter: 4 mm.   GALLBLADDER: Gallbladder:  Normal. Gallstones:  None. Gallbladder sludge:  Present Gallbladder wall thickening:  None. Pericholecystic fluid:  None.   PANCREAS:   Visualized portions are unremarkable.   RIGHT KIDNEY: Craniocaudal length:  10.2 cm,  within normal limits of size for age. No hydronephrosis, hydroureter or focal renal lesion.   PERITONEAL FLUID:   None seen in the right upper quadrant.       Hepatomegaly and diffuse steatosis. No focal lesion.   Signed by: Anibal Nava 5/24/2025 9:50 AM Dictation workstation:   [id number]    CT angio chest for pulmonary embolism  Result Date: 5/24/2025  Interpreted By:  Real Mg, STUDY: CT ANGIO CHEST FOR PULMONARY EMBOLISM;  5/24/2025 3:36 am   INDICATION: Signs/Symptoms:right sided pain worse with deep breath and elevated d dimer.   COMPARISON: 4/17/2025   ACCESSION NUMBER(S): [id number]   ORDERING CLINICIAN: ASHLEY OCAMPO   TECHNIQUE: Contiguous axial images of the chest were obtained after the intravenous administration of  contrast. Coronal and sagittal reformatted images were obtained from the axial images. MIPS of the chest were also performed and reviewed.    FINDINGS: 1.2 cm soft tissue nodule in the right breast.   No axillary or mediastinal lymphadenopathy. 1.5 cm right hilar lymph node.   The heart is normal in size. No significant pericardial effusion. No evidence of acute central, main, lobar, or proximal segmental pulmonary embolism. Evaluation for more distal emboli is limited secondary to timing of contrast bolus.   There is interval development of 11 mm peripheral focal opacity in the right lower lobe and 9 mm focal peripheral nodular opacity in the right upper lobe. There is also a new 14 mm focal opacity in the right lower lobe at the lung base. There is 6 mm new nodular opacity right lower lobe adjacent to the major fissure. There is new 7 mm nodular opacity left lower lobe at the lung base. There is also a new 8 mm focal opacity in the left lower lobe at the lung base. There is also a new 4 mm nodular opacity in the right middle lobe. There is mild groundglass opacity in the right middle lobe anteriorly.   Limited evaluation of the upper abdomen.   Multilevel degenerative change of the thoracic spine.       No evidence of acute central, main, lobar, or proximal segmental pulmonary embolism. Evaluation for more distal emboli is otherwise limited.   Interval development of multiple new focal opacities which are peripherally predominant and have somewhat nodular appearance. The findings are new in comparison to the 4/17/2025 CT examination and may be infectious in etiology. Given the nodular nature of the opacities, a short-term progress CT chest is however recommended in 4 to 6 week to assess stability/resolution and exclude other underlying pathology including malignancy.   Stable 12 mm soft tissue nodular density in the right breast; correlation dedicated mammographic evaluation is recommended.   MACRO: Critical Finding:  See findings. Notification was initiated on 5/24/2025 at 4:31 am by  Real Mg.  (**-YCF-**) Instructions:   Signed by: Real Mg  5/24/2025 4:31 AM Dictation workstation:   [id number]    CT abdomen pelvis wo IV contrast  Result Date: 5/23/2025  Interpreted By:  Willy Redmond, STUDY: CT ABDOMEN PELVIS WO IV CONTRAST; 5/23/2025 3:04 pm   INDICATION: Signs/Symptoms:r/o kidney stone   COMPARISON: April 2024.   ACCESSION NUMBER(S): [id number]   ORDERING CLINICIAN: CLAUDE RODRIGUEZ   TECHNIQUE: Spiral axial unenhanced images were obtained from the upper abdomen to the symphysis pubis. Oral contrast was not administered.   All CT examinations are performed with one or more of the following dose reduction techniques: Automated Exposure Control, adjustment of mA and/or kV according to patient size, or use of iterative reconstruction techniques.   FINDINGS: Lung bases: 7 mm pleural-based nodule is seen in the right lower lobe posteriorly (axial image number 44, with 2 smaller subpleural nodular densities in the left lower lobe posteriorly/inferiorly (axial image number 46). Liver: Normal in size without focal lesions. Gallbladder: Contracted suboptimally evaluated gallbladder. Spleen: Normal in size without focal lesions. Pancreas: Unremarkable. Adrenal glands: No focal lesions. The kidneys are normal in size and position. Fat containing 2.2 cm lesion is again seen in the upper pole of the left kidney, consistent with the known angiomyolipoma. There are no renal calculi or hydronephrosis. No abnormal calcifications are seen within the course of the ureters or within the bladder. There is apparent wall thickening of the bladder, probably related to incomplete distention. Pelvic reproductive organs: Unremarkable.   Unopacified images of the small and large bowel are within normal limits. The appendix is seen and appears normal. There is no free air or free fluid in the abdomen and pelvis. Atherosclerotic calcifications involve the aorta, without focal aneurysm. Mild degenerative changes involve the spine. Mild, 5 mm anterolisthesis of the L4 over the  L5 vertebra is also again seen.       1. Interval development of small subpleural nodular densities in both lower lobes; see below for recommendations. 2. Redemonstration of fat containing partially exophytic lesion in the upper pole of the left kidney, consistent with angiomyolipoma. 3. No acute process in the abdomen and pelvis. No renal calculi or hydronephrosis.   MACRO: Incidental Finding:  Multiple solid non-calcified pulmonary nodules measuring up to 6-8 mm.  (**-YCF-**)   Instructions:  Consider follow up non contrast chest CT at 3-6 months, then consider CT chest at 18-24 months. (Pernell Bird et al., Guidelines for management of incidental pulmonary nodules detected on CT images: From the Fleischner Society 2017, Radiology. 2017 Jul;284 (1):228-243.) [email or web address]   Signed by: Willy Redmond 5/23/2025 4:46 PM Dictation workstation:   [id number]    Assessment/Plan   Assessment & Plan  Intractable pain  -Lumbar back pain  Scheduled toradol and tylenol, lidoderm, prn flexeril  Xray L Spine-f/U    Multiple new focal opacities on CT/Likley Gram Positive PNA  -91% ON RA  Will add azithromycin and Rocephin  Procalcitonin and sputum-f/U  F/U CT in 4-6 weeks with PCP for resolution    Hypothyroidism  Thyroid replacement    HPLD/Hypertriglyceridemia  Statin/fenofibrate    Incidental CT Findings/Abn CT Findings  Right breast nodule-f/U mammogram and f/U with PCP regarding  Hepatomegaly and diffuse steatosis-f/U with PCP for surveillance   A likely angiomyolipoma-Pt already aware and following with nephrology  Nodular densities in the lower lobes-f/U with PCP for continued surveillance and pt aware    Full Code    DVT PX  SCDs, encourage ambulation  Di Carbajal, APRN-CNP                [1]   Family History  Problem Relation Name Age of Onset    Hyperlipidemia Mother      Cancer Mother      Heart disease Father      Hyperlipidemia Father      Thyroid disease Sister      Other (non-hodgkin) Sister      Heart  disease Father's Brother      Heart disease Paternal Grandfather      Coronary artery disease Other      Hypothyroidism Other      Other (non hodgkins lymphoma) Other     [2] acetaminophen, 975 mg, oral, q8h  docusate sodium, 100 mg, oral, BID  ketorolac, 30 mg, intravenous, q6h MARIA DE JESUS  lidocaine, 2 patch, transdermal, Daily  [3]    [4] PRN medications: cyclobenzaprine

## 2025-05-25 NOTE — ED PROVIDER NOTES
HPI   Chief Complaint   Patient presents with    Back Pain       CC: right flank pain  HPI:   54-year-old female presents ED for right flank pain.  Patient was seen earlier and evaluated, she had CT angio of the chest, ultrasound of the gallbladder, and her laboratory workup appears unremarkable.  She was ultimately discharged home however she notes pain continued localized in the right flank worse with movement, denies any nausea vomiting diarrhea denies having any urinary frequency urgency dysuria or gross hematuria she denies having any right lower extremity weakness numbness pain or paresthesia.  She she denies any vaginal bleeding or discharge.    Additional Limitations to History:   External Records Reviewed: I reviewed recent and relevant outside records including   History Obtained From:     Past Medical History: Per HPI  Medications: Reviewed in EMR and with patient  Allergies:  Reviewed in EMR  Past Surgical History:   Social History:     ------------------------------------------------------------------------------------------------------  Physical Exam:  --Vital signs reviewed in nursing triage note, EMR flow sheets, and at patient's bedside  GEN:  A&Ox3, no acute distress, appears comfortable.  Conversational and appropriate.  No confusion or gross mental status changes.  EYES: EOMI, non-injected sclera.  ENT: Moist mucous membranes, no apparent injuries or lesions.   CARDIO: Normal rate and regular rhythm. No murmurs, rubs, or gallops.  2+ equal pulses of the distal extremities.   PULM: Clear to auscultation bilaterally. No rales, rhonchi, or wheezes. Good symmetric chest expansion.  GI: Soft, non-tender, non-distended. No rebound tenderness or guarding.  SKIN: Warm and dry, no rashes or lesions.  MSK: ROM intact the extremities without contractures.   EXT: No peripheral edema, contusions, or wounds.   NEURO: Cranial nerves II-XII grossly intact. Sensation to light touch intact and equal bilaterally in  upper and lower extremities.  Symmetric 5/5 strength in upper and lower extremities.  PSYCH: Appropriate mood and behavior, converses and responds appropriately during exam.  -------------------------------------------------------------------------------------------------------      Differential Diagnoses Considered:   Chronic Medical Conditions Significantly Affecting Care:   Diagnostic testing considered: [PERC, D-Dimer, PECARN, etc.]    - EKG interpreted by myself   - I independently interpreted: [CXR, CT, POCUS, etc. including your interpretation]  - Labs notable for     Escalation of Care: Appropriate for   Social Determinants of Health Significantly Affecting Care: [Homelessness, lacking transportation, uninsured, unable to afford medications]  Prescription Drug Consideration: [Antibiotics, antivirals, pain medications, etc.]  Discussion of Management with Other Providers:  I discussed the patient/results with: [admitting team, consultant, radiologist, social work, EPAT, case management, PT/OT, RT, PCP, etc.]      Brodie Agee PA-C              Patient History   Medical History[1]  Surgical History[2]  Family History[3]  Social History[4]    Physical Exam   ED Triage Vitals [05/24/25 1954]   Temperature Heart Rate Respirations BP   37.4 °C (99.3 °F) 98 17 130/79      Pulse Ox Temp Source Heart Rate Source Patient Position   94 % Temporal -- --      BP Location FiO2 (%)     Right arm --       Physical Exam      ED Course & MDM                  No data recorded     Jacqueline Coma Scale Score: 15 (05/24/25 1954 : Catherine Scott RN)                           Medical Decision Making      Procedure  Procedures       [1]   Past Medical History:  Diagnosis Date    Acute candidiasis of vulva and vagina 08/09/2018    Candidiasis, vagina    Chronic ethmoidal sinusitis 06/28/2017    Ethmoid sinusitis    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, initial encounter (Multi) 07/11/2016     Contusion of brain    Headache, unspecified 08/09/2017    Acute intractable headache, unspecified headache type    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    Other specified soft tissue disorders     Limb swelling    Pain in unspecified limb 10/04/2016    Limb pain    Paresthesia of skin 10/06/2016    Tingling    Personal history of nicotine dependence 07/11/2016    Former smoker    Personal history of other (healed) physical injury and trauma     History of sprain of knee    Personal history of other diseases of the digestive system 01/31/2017    History of diverticulitis of colon    Personal history of other diseases of the musculoskeletal system and connective tissue 05/27/2014    History of muscle spasm    Personal history of other diseases of the musculoskeletal system and connective tissue 08/15/2016    History of neck pain    Personal history of other diseases of the musculoskeletal system and connective tissue 01/08/2017    History of low back pain    Personal history of other infectious and parasitic diseases 12/04/2017    History of herpes labialis    Personal history of other medical treatment 06/24/2016    History of screening mammography    Personal history of other medical treatment     History of screening mammography    Personal history of other specified conditions 01/14/2017    History of diarrhea    Personal history of other specified conditions 10/14/2016    History of numbness    Personal history of other specified conditions 05/27/2014    History of dysuria    Personal history of other specified conditions 06/25/2018    History of bruising easily    Personal history of other specified conditions 06/25/2018    History of wheezing    Personal history of other specified conditions 01/24/2015    History of fatigue    Personal history of other specified conditions 08/08/2018    History of headache    Personal history of other specified  conditions     History of fatigue    Personal history of pneumonia (recurrent) 08/08/2018    History of pneumonia    Personal history of traumatic brain injury 10/01/2017    History of concussion    Personal history of traumatic brain injury 12/18/2018    History of closed head injury    Post-surgical hypothyroidism 10/30/2013    S/P hysterectomy 10/02/2014    Vaginal lesion 11/28/2011   [2]   Past Surgical History:  Procedure Laterality Date    COLONOSCOPY  09/16/2013    Complete Colonoscopy    DILATION AND CURETTAGE OF UTERUS  09/16/2013    Dilation And Curettage    HYSTERECTOMY  09/16/2013    Hysterectomy    MR HEAD ANGIO WO IV CONTRAST  8/18/2017    MR HEAD ANGIO WO IV CONTRAST 8/18/2017 [facility]    OTHER SURGICAL HISTORY  09/16/2013    Stress Test ECG Performed    OTHER SURGICAL HISTORY  09/16/2013    Biopsy Endometrial, Without Cervical Dilation    TOTAL THYROIDECTOMY  09/16/2013    Thyroid Surgery Total Thyroidectomy   [3]   Family History  Problem Relation Name Age of Onset    Hyperlipidemia Mother      Cancer Mother      Heart disease Father      Hyperlipidemia Father      Thyroid disease Sister      Other (non-hodgkin) Sister      Heart disease Father's Brother      Heart disease Paternal Grandfather      Coronary artery disease Other      Hypothyroidism Other      Other (non hodgkins lymphoma) Other     [4]   Social History  Tobacco Use    Smoking status: Every Day     Current packs/day: 0.50     Average packs/day: 0.5 packs/day for 4.0 years (2.0 ttl pk-yrs)     Types: Cigarettes     Passive exposure: Past    Smokeless tobacco: Never   Substance Use Topics    Alcohol use: Yes     Alcohol/week: 5.0 standard drinks of alcohol     Types: 5 Glasses of wine per week    Drug use: Never        Brodie Agee PA-C  05/24/25 2032         Average packs/day: 0.5 packs/day for 4.0 years (2.0 ttl pk-yrs)     Types: Cigarettes     Passive exposure: Past    Smokeless tobacco: Never   Substance Use Topics    Alcohol use: Yes     Alcohol/week: 5.0 standard drinks of alcohol     Types: 5 Glasses of wine per week    Drug use: Never        Brodie Agee PA-C  05/26/25 [id number]

## 2025-05-25 NOTE — CARE PLAN
The patient's goals for the shift include to take a nap    The clinical goals for the shift include Maintain pain at a tolerable level 3-4 /10 throughout  this shift    Over the shift, the patient did not make progress toward the following goals. Barriers to progression include. Recommendations to address these barriers include.

## 2025-05-26 LAB — PROCALCITONIN SERPL-MCNC: 0.14 NG/ML

## 2025-05-27 ENCOUNTER — PATIENT OUTREACH (OUTPATIENT)
Dept: PRIMARY CARE | Facility: CLINIC | Age: 55
End: 2025-05-27
Payer: COMMERCIAL

## 2025-05-27 DIAGNOSIS — R91.1 PULMONARY NODULE: Primary | ICD-10-CM

## 2025-05-27 LAB — HOLD SPECIMEN: NORMAL

## 2025-05-27 NOTE — PROGRESS NOTES
"Discharge Facility: [facility]   Discharge Diagnosis: Right-sided low back pain without sciatica, unspecified chronicity; Failure of outpatient treatment; Acute right-sided low back pain without sciatica   Admission Date: 5/25/2025   Discharge Date: 5/25/2025     PCP Appointment Date: TBD-office to arrange fup with PCP as needed  Specialist Appointment Date:   Right breast nodule-f/U mammogram and f/U with PCP regarding   Hepatomegaly and diffuse steatosis-f/U with PCP for surveillance   A likely angiomyolipoma-Pt already aware and following with nephrology   Nodular densities in the lower lobes-f/U with PCP for continued surveillance and pt aware   Hospital Encounter and Summary Linked: Yes  ED to Hosp-Admission (Discharged) with Marilee Eden DO; Rey Mack MD (05/24/2025)   See discharge assessment below for further details  Wrap Up  Wrap Up Additional Comments: Successful outreach to the patient has been completed. The patient reports feeling well at home since discharge however things are unchanged from her hospitalization. Still having back pain. Called urology as rec by PCP to make a fup appt but they called her back today and state they don't believe pain is r/t kidneys. We reviewed their new medications and any changes made. The patient confirmed that they have all the necessary supplies and resources at home, and they also have support from family and friends if needed. I emphasized the importance of follow-up appointments with both their primary care physician and specialists to assess treatment response. States she needs either mammogram or diagnostic US for her right breast as \"they always end up ordering that for me after my mammogram for my right breast anyhow\". The patient is aware of my availability for non-emergency concerns and has been provided with my contact information. (5/27/2025  2:29 PM)    Engagement  Call Start Time: 1419 (5/27/2025  2:29 " PM)    Medications  Medications reviewed with patient/caregiver?: Yes (new meds discussed with patient) (5/27/2025  2:29 PM)  Is the patient having any side effects they believe may be caused by any medication additions or changes?: No (5/27/2025  2:29 PM)  Does the patient have all medications ordered at discharge?: Yes (5/27/2025  2:29 PM)  Care Management Interventions: No intervention needed (5/27/2025  2:29 PM)  Prescription Comments: see med list (flexeril; tramadol) (5/27/2025  2:29 PM)  Is the patient taking all medications as directed (includes completed medication regime)?: Yes (5/27/2025  2:29 PM)  Care Management Interventions: Provided patient education (5/27/2025  2:29 PM)    Appointments  Does the patient have a primary care provider?: Yes (5/27/2025  2:29 PM)  Care Management Interventions: Educated patient on importance of making appointment; Advised patient to make appointment (5/27/2025  2:29 PM)  Has the patient kept scheduled appointments due by today?: Yes (5/27/2025  2:29 PM)  Care Management Interventions: Advised to schedule with specialist (5/27/2025  2:29 PM)    Self Management  What is the home health agency?: denies need (5/27/2025  2:29 PM)  What Durable Medical Equipment (DME) was ordered?: n/a (5/27/2025  2:29 PM)    Patient Teaching  Does the patient have access to their discharge instructions?: Yes (5/27/2025  2:29 PM)  Care Management Interventions: Reviewed instructions with patient (5/27/2025  2:29 PM)  What is the patient's perception of their health status since discharge?: Same (5/27/2025  2:29 PM)  Is the patient/caregiver able to teach back the hierarchy of who to call/visit for symptoms/problems? PCP, Specialist, Home Health nurse, Urgent Care, ED, 911: Yes (5/27/2025  2:29 PM)

## 2025-05-29 NOTE — SIGNIFICANT EVENT
Follow Up Phone Call    Outgoing phone call    Spoke to: Mini Johnson Relationship:self   Phone number: [phone]      Outcome: contacted patient/ family   Chief Complaint   Patient presents with    Back Pain          Diagnosis:Not applicable    States she is feeling better but still having the same discomfort in her back. She plans to follow up with her PCP.No further questions or concerns.

## 2025-06-05 LAB
ATRIAL RATE: 100 BPM
P AXIS: 59 DEGREES
P OFFSET: 190 MS
P ONSET: 141 MS
PR INTERVAL: 158 MS
Q ONSET: 220 MS
QRS COUNT: 16 BEATS
QRS DURATION: 86 MS
QT INTERVAL: 340 MS
QTC CALCULATION(BAZETT): 438 MS
QTC FREDERICIA: 403 MS
R AXIS: 67 DEGREES
T AXIS: 51 DEGREES
T OFFSET: 390 MS
VENTRICULAR RATE: 100 BPM

## 2025-06-06 ENCOUNTER — TELEPHONE (OUTPATIENT)
Dept: PRIMARY CARE | Facility: CLINIC | Age: 55
End: 2025-06-06
Payer: COMMERCIAL

## 2025-06-06 NOTE — TELEPHONE ENCOUNTER
----- Message from Yariel Trinidad sent at 5/27/2025  2:27 PM EDT -----  Regarding: hospital fup appt needed    Can you please contact pt to schedule hosp follow up within 13 days of discharge.  This patient was discharged from: [facility]   Discharge diagnosis:   Right-sided low back pain without sciatica, unspecified chronicity; Failure of outpatient treatment; Acute right-sided low back pain without sciatica   Date of discharge:      5/25/2025   Please schedule patient by 6/7 if possible for TCM purposes. Pt got call back from urology stating they don't believe pain is related to kidneys.     Thank you

## 2025-06-10 ENCOUNTER — PATIENT OUTREACH (OUTPATIENT)
Dept: PRIMARY CARE | Facility: CLINIC | Age: 55
End: 2025-06-10
Payer: COMMERCIAL

## 2025-06-16 ENCOUNTER — APPOINTMENT (OUTPATIENT)
Dept: RADIOLOGY | Facility: HOSPITAL | Age: 55
End: 2025-06-16
Payer: COMMERCIAL

## 2025-06-26 ENCOUNTER — APPOINTMENT (OUTPATIENT)
Dept: RADIOLOGY | Facility: HOSPITAL | Age: 55
End: 2025-06-26
Payer: COMMERCIAL

## 2025-06-26 DIAGNOSIS — R91.1 PULMONARY NODULE: ICD-10-CM

## 2025-06-26 PROCEDURE — 71250 CT THORAX DX C-: CPT

## 2025-06-26 PROCEDURE — 71250 CT THORAX DX C-: CPT | Performed by: RADIOLOGY

## 2025-07-16 DIAGNOSIS — F17.210 CIGARETTE NICOTINE DEPENDENCE WITHOUT COMPLICATION: Primary | ICD-10-CM

## 2025-07-16 DIAGNOSIS — R91.1 PULMONARY NODULE: Primary | ICD-10-CM

## 2025-07-16 RX ORDER — BUPROPION HYDROCHLORIDE 150 MG/1
150 TABLET ORAL EVERY MORNING
Qty: 30 TABLET | Refills: 11 | Status: SHIPPED | OUTPATIENT
Start: 2025-07-16 | End: 2025-09-14

## 2025-07-20 DIAGNOSIS — E03.9 HYPOTHYROIDISM, UNSPECIFIED TYPE: ICD-10-CM

## 2025-07-21 RX ORDER — SULFAMETHOXAZOLE AND TRIMETHOPRIM 800; 160 MG/1; MG/1
TABLET ORAL
Qty: 270 TABLET | Refills: 3 | Status: SHIPPED | OUTPATIENT
Start: 2025-07-21

## 2025-10-03 ENCOUNTER — APPOINTMENT (OUTPATIENT)
Dept: PRIMARY CARE | Facility: CLINIC | Age: 55
End: 2025-10-03
Payer: COMMERCIAL

## 2026-05-15 ENCOUNTER — APPOINTMENT (OUTPATIENT)
Dept: UROLOGY | Facility: CLINIC | Age: 56
End: 2026-05-15
Payer: COMMERCIAL